# Patient Record
Sex: FEMALE | Race: BLACK OR AFRICAN AMERICAN
[De-identification: names, ages, dates, MRNs, and addresses within clinical notes are randomized per-mention and may not be internally consistent; named-entity substitution may affect disease eponyms.]

---
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
## 2022-04-11 ENCOUNTER — HOSPITAL ENCOUNTER (EMERGENCY)
Dept: HOSPITAL 97 - ER | Age: 22
Discharge: HOME | End: 2022-04-11
Payer: SELF-PAY

## 2022-04-11 VITALS — DIASTOLIC BLOOD PRESSURE: 90 MMHG | OXYGEN SATURATION: 99 % | SYSTOLIC BLOOD PRESSURE: 113 MMHG

## 2022-04-11 DIAGNOSIS — G40.89: Primary | ICD-10-CM

## 2022-04-11 LAB
BUN BLD-MCNC: 11 MG/DL (ref 7–18)
GLUCOSE SERPLBLD-MCNC: 106 MG/DL (ref 74–106)
HCT VFR BLD CALC: 42.9 % (ref 36–45)
LYMPHOCYTES # SPEC AUTO: 2.1 K/UL (ref 0.7–4.9)
PMV BLD: 8.4 FL (ref 7.6–11.3)
POTASSIUM SERPL-SCNC: 4.2 MMOL/L (ref 3.5–5.1)
RBC # BLD: 4.5 M/UL (ref 3.86–4.86)
SP GR UR: 1 (ref 1–1.03)

## 2022-04-11 PROCEDURE — 36415 COLL VENOUS BLD VENIPUNCTURE: CPT

## 2022-04-11 PROCEDURE — 93005 ELECTROCARDIOGRAM TRACING: CPT

## 2022-04-11 PROCEDURE — 70450 CT HEAD/BRAIN W/O DYE: CPT

## 2022-04-11 PROCEDURE — 99284 EMERGENCY DEPT VISIT MOD MDM: CPT

## 2022-04-11 PROCEDURE — 81025 URINE PREGNANCY TEST: CPT

## 2022-04-11 PROCEDURE — 85025 COMPLETE CBC W/AUTO DIFF WBC: CPT

## 2022-04-11 PROCEDURE — 96361 HYDRATE IV INFUSION ADD-ON: CPT

## 2022-04-11 PROCEDURE — 96365 THER/PROPH/DIAG IV INF INIT: CPT

## 2022-04-11 PROCEDURE — 81003 URINALYSIS AUTO W/O SCOPE: CPT

## 2022-04-11 PROCEDURE — 80048 BASIC METABOLIC PNL TOTAL CA: CPT

## 2022-04-11 PROCEDURE — 82947 ASSAY GLUCOSE BLOOD QUANT: CPT

## 2022-04-11 NOTE — XMS REPORT
Continuity of Care Document

                            Created on:2022



Patient:RAJESH CHOW

Sex:Female

:2000

External Reference #:[id number]





Demographics







                          Address                   [address]



                                                    [place] [address]

 

                          Home Phone                [phone]

 

                          Work Phone                [phone]

 

                          Mobile Phone              [phone]

 

                          Email Address             [address]

 

                          Preferred Language        English

 

                          Marital Status            Unknown

 

                          Pentecostalism Affiliation     Unknown

 

                          Race                      Unknown

 

                          Additional Race(s)        Unavailable



                                                    Unavailable



                                                    Unavailable

 

                          Ethnic Group              Unknown









Author







                          Organization              [facility]

t

 

                          Address                   [address]



                                                    [place] [address]

 

                          Phone                     [phone]









Support







                Name            Relationship    Address         Phone

 

                VIRGILIO         OT              [address]    [phone]



                                                [place] [address] 

 

                VIRGILIO         OT              Unavailable     [phone]

 

                J Regina         Grandparent     [address] [phone]



                                                [place] [address] 

 

                JESSICA FRANKLIN       Grandparent     [address]

e



                                                [place] [address] 









Care Team Providers







                    Name                Role                Phone

 

                    PRITI BESS         Primary Care Physician Unavailable

 

                    GAVIOTA NEWTON           Attending Clinician Unavailable

 

                    Lamar NAVA,  S       Attending Clinician [phone]

 

                    UNKNOWN             Attending Clinician Unavailable

 

                    PRITI Bess         Attending Clinician [phone]

 

                    Nurse,  Pob Immunization Attending Clinician Unavailable

 

                    Gary Engle DO   Attending Clinician [phone]

 

                    GARY ENGLE      Attending Clinician Unavailable

 

                    MONDAY              Attending Clinician Unavailable

 

                    MONDAYMASOUD   Attending Clinician Unavailable

 

                                   Attending Clinician Unavailable

 

                    GAVIOTA NEWTON           Admitting Clinician Unavailable

 

                    MONDAYMASOUD   Admitting Clinician Unavailable









Payers







           Payer Name Policy Type Policy Number Effective Date Expiration Date GAVIOTA weathers

 

           [employer]              [id number] 2021            



                                            00:00:00              

 

           [facility]            [id number]  2017            



           MEDICAID                         00:00:00              







Problems







       Condition Condition Condition Status Onset  Resolution Last   Treating Co

mments 

Source



       Name   Details Category        Date   Date   Treatment Clinician        



                                                 Date                 

 

       CONVULSION        Diagnosis Active 2021              

 Memoria



                                   3-          05:07:00               l



                                   00:00:                             Charlie



              CONVULSION               00                                 



                                                                      



                                                                      



               Active                                                  



                                                                      



                                                                      



              2021                                                  



                                                                      



                                                                      



                                                                      



                                                                      



                                                                      



                                                                      



                                                                      



                                                                      



                     [facility]                                                  



                                                                      



                                                                      

 

       No known No known Disease                                           Unive

rs



       active active                                                  ity of



       problems problems                                                  [facility]

 

       Convulsion Convulsion Problem Active                                    U

nivers



       s,     s,                                                      ity of



       unspecifie unspecifie                                                  [place]

[place]



       d      d                                                       Physici



       convulsion convulsion                                                  an

s



       type   type                                                    

 

       Partial Partial Problem Active                                    [facility]



       symptomati symptomati                                                  it

y of



       c epilepsy c epilepsy                                                  Te

[place]



       with   with                                                    Physici



       complex complex                                                  ans



       partial partial                                                  



       seizures, seizures,                                                  



       not    not                                                     



       intractabl intractabl                                                  



       e, without e, without                                                  



       status status                                                  



       epilepticu epilepticu                                                  



       s      s                                                       

 

       Psychogeni Psychogeni Problem Active                                    U

nivers



       c      c                                                       ity of



       nonepilept nonepilept                                                  Te

[place]



       ic seizure ic seizure                                                  Ph

ysici



                                                                      ans







Allergies, Adverse Reactions, Alerts







       Allergy Allergy Status Severity Reaction(s) Onset  Inactive Treating Comm

ents 

Source



       Name   Type                        Date   Date   Clinician        

 

       NO KNOWN Drug   Active                                           [facility]



       ALLERGIE Class                                                   ity of



       S                                                              [facility]







Family History







           Family Member Diagnosis  Comments   Start Date Stop Date  Source

 

           Grandmother Family history of                                  Univer

sity of



                      hypertension                                  [place] [facility]

ians

 

           Grandmother Family history of                                  Univer

sity of



                      cerebrovascular                                  [place] [facility]

sicians



                      accident (CVA)                                  







Social History







           Social Habit Start Date Stop Date  Quantity   Comments   Source

 

           Exposure to                       Not sure              [facility]



           SARS-CoV-2                                             [facility]



           (event)                                                Branch

 

           Alcohol intake 2021 Current               [place]

 of



                      00:00:00   00:00:00   non-drinker of            [place] [facility]

amrik



                                            alcohol               [place]



                                            (finding)             

 

           Tobacco use and 2019 Never used            [facility]

y of



           exposure   00:00:00   00:00:00                         [facility]

 

           Sex Assigned At 2000                       [facility]

y of



           Birth      00:00:00   00:00:00                         [facility]









                Smoking Status  Start Date      Stop Date       Source

 

                Social History                                  [facility]







Medications







       Ordered Filled Start  Stop   Current Ordering Indication Dosage Frequency

 Signature

                    Comments            Components          Source



     Medication Medication Date Date Medication? Clinician                (SIG) 

          



     Name Name                                                   

 

     benzonatate      2021      Yes       [id number] 100mg      Take 1          

 Univers



     100 mg      1-30                               capsule by           ity of



     capsule      00:00:                               mouth 3           [place]



               00                                 (three)           Medical



                                                  times           [place]



                                                  daily as           



                                                  needed for           



                                                  Cough.           

 

     Saline            No                       Notes:           [facility]



     Flush 0.9%      5-02                               Same as:           l



               02:00:                               BD             Charlie



                                                Posiflush           



                                                  Sterile           

 

     Saline            No                       Notes:           [facility]



     Flush 0.9%      5-01                               Same as:           l



               15:58:                               BD             Charlie



               00                                 Posiflush           



                                                  Sterile           

 

     medroxyPROG            Yes                      GIVE 1 ML           U

nivers



     ESTERone      3-26                               IM IN LEFT           ity o

f



     150 mg/mL      00:00:                               DELTOID OF           Te

[place]



     syringe      00                                 ARM- BRING           Medica

l



                                                  TO [facility]           Branch

 

     medroxyPROG            Yes                      GIVE 1 ML           U

nivers



     ESTERone      3-26                               IM IN LEFT           ity o

f



     150 mg/mL      00:00:                               DELTOID OF           Te

xas



     syringe      00                                 ARM- BRING           Medica

l



                                                  TO [facility]           Branch

 

     medroxyPROG            Yes                      GIVE 1 ML           U

nivers



     ESTERone      3-26                               IM IN LEFT           ity o

f



     150 mg/mL      00:00:                               DELTOID OF           Te

[place]



     syringe      00                                 ARM- BRING           Medica

l



                                                  TO [facility]

 

     levETIRAcet levETIRAcet           Yes  LVN       1    Q0.5D TAKE 1         

  Univers



     am 500 MG am 500 MG                                    TABLET           ity

 of



     Oral Tablet Oral Tablet                                    TWICE           

[place]



                                                  DAILY AS           Physici



                                                  DIRECTED           ans







Immunizations







           Ordered    Filled Immunization Date       Status     Comments   [facility]

e



           Immunization Name Name                                        

 

           SARS-COV-2 COVID-19            2021 Completed             Unive

rsity of



           PFIZER VACCINE            00:00:00                         [facility]

 

           SARS-COV-2 COVID-19            2021 Completed             Unive

rsity of



           PFIZER VACCINE            00:00:00                         [facility]

 

           SARS-COV-2 COVID-19            2021 Completed             Unive

rsity of



           PFIZER VACCINE            00:00:00                         [facility]

 

           SARS-COV-2 COVID-19            2021 Completed             Unive

rsity of



           PFIZER VACCINE            00:00:00                         [facility]

 

           SARS-COV-2 COVID-19            2021 Completed             Unive

rsity of



           PFIZER VACCINE            00:00:00                         [facility]

 

           SARS-COV-2 COVID-19            2021 Completed             Unive

rsity of



           PFIZER VACCINE            00:00:00                         [place] [facility]

amrik



                                                                  Branch







Vital Signs







             Vital Name   Observation Time Observation Value Comments     Source

 

             Systolic blood 2021   137 mm[Hg]                [facility]



             pressure     19:22:00                               [facility]

 

             Diastolic blood 2021   91 mm[Hg]                 [place] [facility]





             pressure     19:22:00                               [facility]

 

             Heart rate   2021   81 /min                   [facility]



                          19:22:00                               [facility]

 

             Body temperature 2021   36.5 Roberta                  [facility] 





                          19:22:00                               [facility]

 

             Respiratory rate 2021   20 /min                   [facility]



                          19:22:00                               [facility]

 

             Body weight  2021   69.854 kg                 [facility]



                          19:22:00                               [facility]

 

             Oxygen saturation 2021   100 /min                  [facility]



             in Arterial blood 19:22:00                               [place] [facility]

am[facility]



             by Pulse oximetry                                        Branch

 

             Temperature Oral 2021   98.9 F                    [facility]

[facility]



             (F)          14:00:00                               

 

             Heart Rate   2021                             [facility]

n



                          14:00:00                               

 

             Respitory Rate 2021                             [facility]

[facility]



                          14:00:00                               

 

             Systolic (mm Hg) 2021                             [facility]

[facility]



                          14:00:00                               

 

             Diastolic (mm Hg) 2021                             [facility]

ermann



                          14:00:00                               

 

             Temperature Oral 2021   98.9 F                    [facility]

[facility]



             (F)          01:00:00                               

 

             Heart Rate   2021                             [facility]

n



                          01:00:00                               

 

             Respitory Rate 2021                             [facility]

[facility]



                          01:00:00                               

 

             Systolic (mm Hg) 2021                             [facility]

rm[facility]



                          01:00:00                               

 

             Diastolic (mm Hg) 2021                             [facility]

ermann



                          01:00:00                               

 

             Temperature Oral 2021   99 F                      [facility]

[facility]



             (F)          19:00:00                               

 

             Heart Rate   2021                             [facility]

n



                          19:00:00                               

 

             Respitory Rate 2021                             [facility]

[facility]



                          19:00:00                               

 

             Systolic (mm Hg) 2021                             [facility]

[facility]



                          19:00:00                               

 

             Diastolic (mm Hg) 2021                             [facility]

ermann



                          19:00:00                               

 

             Height       2021   162.56 cm                 [facility]

n



                          14:55:00                               

 

             Weight       2021                             [facility]

n



                          14:55:00                               

 

             BMI Calculated 2021                             [facility]

[facility]



                          14:55:00                               

 

             Body temperature 2021-03-10   97.3 [degF]  Method:      [facility]



                          09:44:00                  Temporal     [place] [facility]

s

 

             Heart Rate   2021-03-10   92 /min                   [facility]



                          09:44:00                               [place] [facility]

s

 

             Systolic blood 2021-03-10   119 mm[Hg]   [facility]     09:44:00                  Position:    [place] [facility]

s



                                                    Sitting      

 

             Diastolic blood 2021-03-10   80 mm[Hg]    Location: [facility]     09:44:00                  Position:    [place] [facility]

s



                                                    Sitting      

 

             Body height  2021-03-10   65 [in_us]                [facility]



                          09:44:00                               [place] [facility]

s

 

             Weight       2021-03-10   156.5 [lb_av]              [facility]



                          09:44:00                               [place] [facility]

s

 

             Body mass index 2021-03-10   26.04 kg/m2               [facility]



             (BMI) [Ratio] 09:44:00                               [place] [facility]

ns







Procedures







                Procedure       Date / Time     Performing Clinician Source



                                Performed                       

 

                XR CHEST 1 VW   2021 19:51:00 Chrystal Newton  [place] [facility]

[facility]

 

                CONSENT/REFUSAL FOR 2021 19:12:50 Doctor Unassigned, No [facility]

iversity of 

[place]



                DIAGNOSIS AND TREATMENT                 Name            [facility]

 

                SARS-COV-2 COVID-19 2021 22:34:51 Doctor Unassigned, No [facility]

[facility] 

[place]



                VACCINE,0.3ML,IM                 Name            [facility]



                (PFIZER)                                        

 

                [UTP] Neuro EMU 2021-04-15 00:00:00                 [facility]



                                                                Physicians

 

                [UTP] Neuro EMU 2021 00:00:00                 [facility]



                                                                Physicians

 

                History of Tonsillectomy                                 [facility]



                                                                Physicians







Plan of Care







             Planned Activity Planned Date Details      Comments     Source

 

             Diagnostic Test 2021-04-15   [UTP] Neuro EMU              [facility]



             Pending      00:00:00     [code = [UTP]              Physicians



                                       Neuro EMU]                

 

             Diagnostic Test 2021-04-15   [UTP] Neuro EMU              [facility]

[facility]



             Pending      00:00:00     [code = [UTP]              Physicians



                                       Neuro EMU]                

 

             Diagnostic Test 2021-04-15   [UTP] Neuro EMU              Univers[facility]

[facility]



             Pending      00:00:00     [code = [UTP]              Physicians



                                       Neuro EMU]                

 

             Diagnostic Test 2021-04-15   [UTP] Neuro EMU              [facility]



             Pending      00:00:00     [code = [UTP]              Physicians



                                       Neuro EMU]                

 

             Diagnostic Test 2021   [UTP] Neuro EMU              [facility]



             Pending      00:00:00     [code = [UTP]              Physicians



                                       Neuro EMU]                

 

             Diagnostic Test 2021   [UTP] Neuro EMU              Universit

[facility]



             Pending      00:00:00     [code = [UTP]              Physicians



                                       Neuro EMU]                

 

             Diagnostic Test 2021   [UTP] Neuro EMU              Universit

[facility]



             Pending      00:00:00     [code = [UTP]              Physicians



                                       Neuro EMU]                

 

             Diagnostic Test 2021   [UTP] Neuro EMU              Universit

[facility]



             Pending      00:00:00     [code = [UTP]              Physicians



                                       Neuro EMU]                

 

             Diagnostic Test 2021   [UTP] Neuro EMU              Universit

[facility]



             Pending      00:00:00     [code = [UTP]              Physicians



                                       Neuro EMU]                

 

             Diagnostic Test 2021   [UTP] Neuro EMU              Universit

[facility]



             Pending      00:00:00     [code = [UTP]              Physicians



                                       Neuro EMU]                







Encounters







        Start   End     Encounter Admission Attending Care    Care    Encounter 

Source



        Date/Time Date/Time Type    Type    Clinicians Facility Department ID   

   

 

        2021         Emergency                 [facility]    [id number] 

Univers



        16:50:45                                                         [facility]

 

        2021 Emergency X       LAMAR  [facility]    ERT     [id number]

[id number] Univers



        13:23:00 15:46:00                 CHRYSTAL                           [facility]

 

        2021 [facility]         [facility]    [phone] [id number]

1649 Univers



        13:23:00 15:46:00                 Chrystal VILLAFUERTE [id number]         i

[facility] [id number]         [facility]  [phone]         [address]

 

        2021 Outpatient R               [facility]    [id number]

-20 Univers



        20:40:00 20:40:00                                         [id number]  [facility]

 

        2021 Outpatient R       UNKNOWN, [facility]    [id number]

[id number] Univers



        20:40:00 20:40:00                 ATTENDING                         [facility]

 

        2021 Telephone         MIKE Bess    [id number]

[id number] Univers



        00:00:00 00:00:00                 Natalie ALBRIGHT   [id number]       

  [facility] [id number]         Shree

as



                                                        [phone]         Medi

amrik



                                                        019             Branch

 

        2021 Imm/Inj         Nurse, Adc Pob Immunization [facility]  

  [id number] 

[id number]                                Univers



        17:40:00 17:50:00 Visit           OnielSteventon 350.1.13

.10         [facility] [id number]         [place]

[place]



                                                [place] [phone]         Me

dical



                                                nal     421             [facility]                 

 

        2021 Outpatient RERE ENGLE  [facility]    [id number]

815 Univers



        17:40:00 17:40:00                 [facility]

 

        2021 Outpatient RERE ENGLE  [facility]    [id number]

334 Univers



        17:50:00 17:50:00                 [facility]

 

        2021 Outpatient                 [facility]    [id number]

670 Univers



        11:30:00 11:30:00                                                 [facility]

 

        2021 Outpatient RERE ENGLE  [facility]    [id number]

217 Univers



        17:00:00 17:00:00                 [facility]

 

        [facility]    [id number]

593 [facility]



        14:30:00 14:30:00                 [facility]

 

        2021 Outpatient                 [facility]    [id number]

989 Univers



        17:50:00 17:50:00                                                 [facility]

 

        2021 Appointmen         MONDAY, [facility]     Neurology - 743

[id number] Univers



        16:30:00 16:30:00 t; SATYA DUCKWORTH [place] of



                        DANE HERNADEZ            [facility]



                        M.D.                            [place]          Physici



                                                                        ans

 

        2021 Inpatient                 [facility] [id number]

[address] Memoria



        15:02:00 21:20:00                         rere Guerra 00      [facility]

 

        2021 Inpatient         MONDAY, [facility]    7500    

[facility]



        10:02:00 16:20:00                 SATYA                         

 

        2021 Outpatient R               [facility]    [id number]

-20 Univers



        13:00:00 13:00:00                                         [id number]  [facility]

 

        2021-03-10 2021-03-10 Appointmen         ANN DUCKWORTH     Neurology - 716

[id number] Univers



        09:30:00 09:30:00 t; SATYA DUCKWORTH [place] KIMBERLY, M.D.            [facility]                            [place]          Physici



                                                                        ans

 

        2020 Outpatient                 [facility]    [id number]

-20 Univers



        15:40:00 15:40:00                                         2012  [facility]

 

        2020 [facility]       [facility]    [id number]

551 Univers



        15:40:00 15:40:00                 ANJALI                           [facility]







Results







           Test Description Test Time  Test Comments Results    Result     Sourc

e



                                                       Comments   

 

           URINE CHEM 2021            Negative              [facility]



                      22:15:00              (21 5:15            [facility]



                                            PM)                   

 

           CHEM PANEL 2021            1.02                  [facility]



                      20:25:00                                    [facility]

 

           CHEM PANEL 2021            139                   [facility]



                      20:25:00                                    [facility]

 

           CHEM PANEL 2021            4.0                   [facility]



                      20:25:00                                    [facility]

 

           CHEM PANEL 2021            110                   [facility]



                      20:25:00                                    [facility]

 

           CHEM PANEL 2021            24                    [facility]



                      20:25:00                                    [place]

 

           CHEM PANEL 2021            9.0                   [facility]



                      20:25:00                                    [facility]

 

           CHEM PANEL 2021            7.7                   [facility]



                      20:25:00                                    [facility]

 

           CHEM PANEL 2021            4.1                   [facility]



                      20:25:00                                    [facility]

 

           CHEM PANEL 2021            18                    [facility]



                      20:25:00                                    [facility]

 

           CHEM PANEL 2021            12                    [facility]



                      20:25:00                                    [place]

 

           CHEM PANEL 2021            106                   [facility]



                      20:25:00                                    [facility]

 

           CHEM PANEL 2021            0.3                   [facility]



                      20:25:00                                    [facility]

 

           CHEM PANEL 2021            9.0                   [facility]



                      20:25:00                                    [place]









                    CHEM PANEL          2021 20:25:00 









                      Test Item  Value      Reference Range Interpretation Comme

nts









             B/C Ratio (test code = B/C Ratio) 13 1         6-25                

      



[facility]ann[facility]M [id number] 20:25:003.6[facility]annCHEM PANEL
2021 20:25:00





             Test Item    Value        Reference Range Interpretation Comments

 

             A/G Ratio (test code = A/G Ratio) 1.1 1        0.7-1.6             

      



[facility]annCHEM [id number] 20:25:0080[facility]annHEMATOLOGY
2021 20:25:0047.7[facility]orial ZfmojdrRYYXIMKWTU5351-43-78 20:25:0041.3[facility]
ZmmmhsxEGEQVPREIK2335-12-75 20:25:009.0[facility] LndvtomJXHJHHNAOZ0558-54-12 
20:25:001.5[facility] YzgvbweIGYKKWZIEO0055-15-84 20:25:000.5[facility]
DGNGPMXXEA1167-52-01 20:25:003.3[facility] XectjljVNOIZPLPAJ5221-15-69 20:25:002.9
[facility] TdfmckmDOFYWBLAJH9189-55-01 20:25:000.6[facility]annHEMATOLOGY
2021 20:25:000.1[facility] RboyrfqDBOKEUJKRH0932-44-98 20:25:006.9Memorial 
CdvxgmbAHTXEXDIAW4387-77-11 20:25:004.54[facility] NvecvusFMNEWLXSPY3975-98-44 
20:25:0014.7[facility] DufbdyhFZNYQRBYRJ8560-81-57 20:25:0043.5[facility]
YCGAPYRCTJ5085-74-60 20:25:0095.9[facility] GoajbryIRKIZCALBZ6114-08-32 20:25:00





             Test Item    Value        Reference Range Interpretation Comments

 

             MCH (test code = MCH) 32.4 pg      27.0-31.0                 



[facility] IqruvdkHOSNTJORUI6414-74-00 20:25:0033.8[facility] HermannHEMATOLOGY
2021 20:25:0012.6[facility] DgxgielBEIJAFEKGY6397-74-38 20:25:99042Qksgmtaf 
MxtxwjtCXPZCYJVBQ6009-44-87 20:25:009.1[facility] HermannCHEM XTYSF3833-37-63 
20:25:00&lt;0.1[facility] HermannCHEM ORBRG7122-24-93 20:25:0083[facility]
CHEM [id number] 20:25:0013[facility] VugvzfzYNJXDIEZIQ8602-42-62 15:59:00Not 
Detected (21 10:59 AM)[facility]

## 2022-04-11 NOTE — EDPHYS
Physician Documentation                                                                           

 [facility]                                                                 

Name: Maria Teresa Mtz                                                                                  

Age: 21 yrs                                                                                       

Sex: Female                                                                                       

: 2000                                                                                   

MRN: [id number]                                                                                   

Arrival Date: 2022                                                                          

Time: 12:06                                                                                       

Account#: [id number]                                                                            

Bed 3                                                                                             

Private MD:                                                                                       

ED Physician Steve Gary                                                                         

HPI:                                                                                              

                                                                                             

13:04 This 21 yrs old Black Female presents to ER via Other with complaints of possible       rn  

      seizure.                                                                                    

13:04 The patient presents with a history of multiple seizures, a total of 2. Character of    rn  

      seizure(s): Motor activity: generalized, Incontinence: none, Apnea: the patient did not     

      experience apnea, Circulation: the patient did not experience evidence of pulse             

      disturbance. Seizure onset: just prior to arrival. Seizure Hx: Last seizure: The            

      patient's last seizure was approximately 6 month(s) ago. Associated injury: The patient     

      did not suffer any apparent associated injury. Current symptoms: headache, soreness.        

      The patient has experienced similar episodes in the past. The patient has not recently      

      seen a physician. Pt is employee, in cafeteria, had just received bad news that             

      grandmother , sat down, then witnessed to have 2 brief episodes of possible seizure     

      activity, each lasted approx 30 seconds followed by sluggish response and confusion.        

      Currently reports headache and generalized soreness. No injury. Reports has had similar     

      episodes in past, seen by neurology, was put on keppra at one point, but then later         

      told they didn't think it was seizures, could be anxiety is what she said. No fever or      

      recent illness. .                                                                           

                                                                                                  

OB/GYN:                                                                                           

13:13 LMP N/A - Birth control method                                                          [id number] 

                                                                                                  

Historical:                                                                                       

- Allergies:                                                                                      

12:19 No Known Allergies;                                                                     jg9 

- Home Meds:                                                                                      

12:19 Depo-Provera IM [Active];                                                               jg9 

- PMHx:                                                                                           

12:19 Seizures;                                                                               jg9 

- PSHx:                                                                                           

12:19 Tonsillectomy;                                                                          jg9 

                                                                                                  

- Immunization history:: Client reports receiving the 2nd dose of the Covid vaccine.              

- Social history:: Smoking status: Patient denies any tobacco usage or history of.                

- Family history:: not pertinent.                                                                 

- Hospitalizations: : No recent hospitalization is reported.                                      

                                                                                                  

                                                                                                  

ROS:                                                                                              

13:04 Constitutional: Negative for fever, chills, and weight loss, Eyes: Negative for injury, rn  

      pain, redness, and discharge, Neck: Negative for injury, pain, and swelling,                

      Cardiovascular: Negative for chest pain, palpitations, and edema, Respiratory: Negative     

      for shortness of breath, cough, wheezing, and pleuritic chest pain, Abdomen/GI:             

      Negative for abdominal pain, nausea, vomiting, diarrhea, and constipation, Back:            

      Negative for injury and pain, MS/Extremity: Negative for injury and deformity, Skin:        

      Negative for injury, rash, and discoloration, Neuro: Negative for weakness, numbness,       

      tingling                                                                                    

                                                                                                  

Exam:                                                                                             

13:04 Constitutional:  This is a well developed, well nourished patient who is awake, alert,  rn  

      and in no acute distress. Head/Face:  Normocephalic, atraumatic. Eyes:  Pupils equal        

      round and reactive to light, extra-ocular motions intact.  ENT:  No tongue laceration       

      Neck:  Trachea midline, no masses palpated, and no cervical lymphadenopathy.  Supple,       

      full range of motion without nuchal rigidity, or vertebral point tenderness.  No            

      Meningismus. Cardiovascular:  Regular rate and rhythm.  No pulse deficits. Respiratory:     

       No increased work of breathing, no retractions or nasal flaring. Abdomen/GI:  Soft,        

      non-tender Skin:  Warm, dry with normal turgor.  Normal color with no rashes, no            

      lesions, and no evidence of cellulitis. MS/ Extremity:  Pulses equal, no cyanosis.          

      Neurovascular intact.  Full, normal range of motion.  Equal circumference. Neuro:           

      Awake and alert, GCS 15, oriented to person, place, time, and situation.  Cranial           

      nerves II-XII grossly intact.  Motor strength 5/5 in all extremities.  Sensory grossly      

      intact.  Cerebellar exam normal.                                                            

                                                                                                  

Vital Signs:                                                                                      

11:45  / 110; Pulse 95; Resp 18 S; Pulse Ox 99% on R/A; Weight 65.77 kg; Height 5 ft. 6 jg9 

      in. (167.64 cm); Pain 0/10;                                                                 

13:13  / 90; Pulse 78; Resp 14 S; Pulse Ox 99% on R/A;                                  jg9 

11:45 Body Mass Index 23.40 (65.77 kg, 167.64 cm)                                             jg9 

                                                                                                  

MDM:                                                                                              

12:06 Patient medically screened.                                                             rn  

14:27 Differential diagnosis: seizure. Differential diagnosis: syncope, stress reaction,      rn  

      hyperventilation. Data reviewed: vital signs, nurses notes.                                 

14:27 Counseling: I had a detailed discussion with the patient and/or guardian regarding: the rn  

      historical points, exam findings, and any diagnostic results supporting the                 

      discharge/admit diagnosis, lab results, radiology results, the need for outpatient          

      follow up, to return to the emergency department if symptoms worsen or persist or if        

      there are any questions or concerns that arise at home. Response to treatment: the          

      patient's symptoms have resolved after treatment, the patient's condition has returned      

      to base line, the patient is now symptom free, and as a result, I will discharge            

      patient. Special discussion: I discussed with the patient/guardian in detail that at        

      this point there is no indication for admission to the hospital. It is understood,          

      however, that if the symptoms persist or worsen the patient needs to return immediately     

      for re-evaluation.                                                                          

                                                                                                  

                                                                                             

12:07 Order name: CBC with Diff; Complete Time: 12:41                                         rn  

                                                                                             

12:07 Order name: Basic Metabolic Panel; Complete Time: 12:41                                 rn  

                                                                                             

12:21 Order name: glucometer results - FOR PT WITH NO ID; Complete Time: 13:42                ss  

                                                                                             

12:39 Order name: Urine Dipstick-Ancillary; Complete Time: 12:41                              EDMS

                                                                                             

14:23 Order name: Pregnancy Test Urine - POC                                                  jg9 

                                                                                             

12:07 Order name: CT Head Brain wo Cont; Complete Time: 12:41                                 rn  

                                                                                             

12:07 Order name: IV Start; Complete Time: 12:15                                              rn  

                                                                                             

12:07 Order name: EKG; Complete Time: 12:08                                                   rn  

                                                                                             

12:07 Order name: EKG - Nurse/Tech; Complete Time: 12:15                                      rn  

                                                                                             

12:07 Order name: Cardiac monitoring; Complete Time: 12:15                                    rn  

                                                                                             

12:07 Order name: O2 Sat Monitoring; Complete Time: 12:15                                     rn  

                                                                                             

12:07 Order name: Urine Dipstick-Ancillary (obtain specimen); Complete Time: 12:40            rn  

                                                                                             

12:07 Order name: Urine Pregnancy Test (obtain specimen); Complete Time: 12:40                rn  

                                                                                             

12:07 Order name: Glucose Level; Complete Time: 12:15                                         rn  

                                                                                                  

Administered Medications:                                                                         

12:26 Drug: NS 0.9% 500 ml Route: IV; Rate: bolus; Site: right antecubital;                   ww  

13:18 Follow up: IV Status: Completed infusion; IV Intake: 500ml                              jg9 

12:27 Drug: Keppra (levETIRAcetam) 1000 mg Route: IV; Rate: calculated rate; Site: right      ww  

      antecubital;                                                                                

12:45 Follow up: IV Status: Completed infusion; IV Intake: 250ml                              jg9 

                                                                                                  

                                                                                                  

Point of Care Testing:                                                                            

      Urine Pregnancy:                                                                            

14:26 hCG Reading: Negative; Control Reading: Positive;                                       jg9 

Disposition Summary:                                                                              

22 14:29                                                                                    

Discharge Ordered                                                                                 

      Location: Home                                                                          rn  

      Problem: new                                                                            rn  

      Symptoms: have improved                                                                 rn  

      Condition: Stable                                                                       rn  

      Diagnosis                                                                                   

        - Other seizures                                                                      rn  

      Followup:                                                                               rn  

        - With: Shane Lewis MD                                                               

        - When: As needed                                                                          

        - Reason: Recheck today's complaints, Re-evaluation by your physician                      

      Discharge Instructions:                                                                     

        - Discharge Summary Sheet                                                             rn  

        - Seizure, Adult                                                                      rn  

      Forms:                                                                                      

        - Medication Reconciliation Form                                                      rn  

        - Thank You Letter                                                                    rn  

        - Antibiotic Education                                                                rn  

        - Prescription Opioid Use                                                             rn  

      Prescriptions:                                                                              

        - Keppra 500 mg Oral Tablet                                                                

            - take 1 tablet by ORAL route every 12 hours; 60 tablet; Refills: 0, Product      rn  

      Selection Permitted                                                                         

Signatures:                                                                                       

Dispatcher [employer]                           Steve Jimenez MD MD rn Gilmore, Jennifer, RN                   RN   jg9                                                  

Tyesha Echevarria RN                       MIKE                                                      

                                                                                                  

**************************************************************************************************

## 2022-04-11 NOTE — ER
Nurse's Notes                                                                                     

 [facility]                                                                 

Name: Maria Teresa Mtz                                                                                  

Age: 21 yrs                                                                                       

Sex: Female                                                                                       

: 2000                                                                                   

MRN: [id number]                                                                                   

Arrival Date: 2022                                                                          

Time: 12:06                                                                                       

Account#: [id number]                                                                            

Bed 3                                                                                             

Private MD:                                                                                       

Diagnosis: Other seizures                                                                         

                                                                                                  

Presentation:                                                                                     

                                                                                             

11:45 Chief complaint: Patient had a seizure. Ebola Screen: Patient negative for fever        jg9 

      greater than or equal to 101.5 degrees Fahrenheit, and additional compatible Ebola          

      Virus Disease symptoms Patient denies exposure to infectious person. Patient denies         

      travel to an Ebola-affected area in the 21 days before illness onset. Initial Sepsis        

      Screen: Does the patient meet any 2 criteria? No. Patient's initial sepsis screen is        

      negative. Does the patient have a suspected source of infection? No. Patient's initial      

      sepsis screen is negative. Risk Assessment: Do you want to hurt yourself or someone         

      else? Patient reports no desire to harm self or others. Onset of symptoms was 2022.                                                                                       

11:45 Method Of Arrival: Other                                                                [facility] [place] 

11:45 Acuity: BRITTON 3                                                                           [facility] [place] 

13:15 Coronavirus screen: Vaccine status: Patient reports receiving the 2nd dose of the covid jg9 

      vaccine.                                                                                    

                                                                                                  

Triage Assessment:                                                                                

11:45 General: Appears in no apparent distress. Behavior is calm, cooperative, Patient        jenn 

      reports that she has been advised that she does not have a seizure disorder. Patient        

      reports multiple similar events in the past for which she had a complete work up on and     

      was advised it was secondary to her anxiety-patient a\T\ox4.                                

11:45 Pain: Denies pain.                                                                      jg9 

                                                                                                  

OB/GYN:                                                                                           

13:13 LMP N/A - Birth control method                                                          jg9 

                                                                                                  

Historical:                                                                                       

- Allergies:                                                                                      

12:19 No Known Allergies;                                                                     jg9 

- Home Meds:                                                                                      

12:19 Depo-Provera IM [Active];                                                               jg9 

- PMHx:                                                                                           

12:19 Seizures;                                                                               jg9 

- PSHx:                                                                                           

12:19 Tonsillectomy;                                                                          jg9 

                                                                                                  

- Immunization history:: Client reports receiving the 2nd dose of the Covid vaccine.              

- Social history:: Smoking status: Patient denies any tobacco usage or history of.                

- Family history:: not pertinent.                                                                 

- Hospitalizations: : No recent hospitalization is reported.                                      

                                                                                                  

                                                                                                  

Screenin:20 Abuse screen: Denies threats or abuse. Denies injuries from another. Nutritional        jg9 

      screening: No deficits noted. Tuberculosis screening: No symptoms or risk factors           

      identified. Fall Risk None identified.                                                      

                                                                                                  

Assessment:                                                                                       

13:15 Reassessment: Patient appears in no apparent distress at this time. Patient and/or      jg9 

      family updated on plan of care and expected duration. Pain level reassessed. Patient        

      states feeling better.                                                                      

                                                                                                  

Vital Signs:                                                                                      

11:45  / 110; Pulse 95; Resp 18 S; Pulse Ox 99% on R/A; Weight 65.77 kg; Height 5 ft. 6 jg9 

      in. (167.64 cm); Pain 0/10;                                                                 

13:13  / 90; Pulse 78; Resp 14 S; Pulse Ox 99% on R/A;                                  jg9 

11:45 Body Mass Index 23.40 (65.77 kg, 167.64 cm)                                             jg9 

                                                                                                  

ED Course:                                                                                        

12:05 Inserted saline lock: 20 gauge in right forearm, using aseptic technique. Blood         jg9 

      collected.                                                                                  

12:06 Patient arrived in ED.                                                                  rn  

12:06 Steve Gary MD is Attending Physician.                                                rn  

12:18 EKG done, by ED staff, reviewed by Steve Gary MD.                                      mb7 

12:18 Patient has correct armband on for positive identification. Bed in low position. Call   mb7 

      light in reach. Side rails up X 1. Door closed. Noise minimized. Warm blanket given.        

12:19 Triage completed.                                                                       jg9 

12:22 CT Head Brain wo Cont In Process Unspecified.                                           EDMS

13:10 Patient requesting to go to the restroom-no assistance needed.                          jg9 

13:15 Arm band placed on right wrist.                                                         jg9 

13:19 No provider procedures requiring assistance completed.                                  jg9 

13:20 Awaiting disposition, Awaiting: Patient reports she is ready to go home, patient a\T\ox   jg9

      4, NAD, vss.                                                                                

14:27 Pregnancy Test Urine - POC Sent.                                                        jg9 

14:27 Pregnancy Test, Serum: urine specific gravity too low for urine pregnancy test Sent.    jg9 

14:28 Shane Lewis MD is Referral Physician.                                             rn  

14:46 IV discontinued, bleeding controlled, No redness/swelling at site. Pressure dressing    ww  

      applied.                                                                                    

                                                                                                  

Administered Medications:                                                                         

12:26 Drug: NS 0.9% 500 ml Route: IV; Rate: bolus; Site: right antecubital;                   ww  

13:18 Follow up: IV Status: Completed infusion; IV Intake: 500ml                              jg9 

12:27 Drug: Keppra (levETIRAcetam) 1000 mg Route: IV; Rate: calculated rate; Site: right      ww  

      antecubital;                                                                                

12:45 Follow up: IV Status: Completed infusion; IV Intake: 250ml                              jg9 

                                                                                                  

                                                                                                  

Point of Care Testing:                                                                            

      Urine Pregnancy:                                                                            

14:26 hCG Reading: Negative; Control Reading: Positive;                                       jg9 

Intake:                                                                                           

12:45 IV: 250ml; Total: 250ml.                                                                jg9 

13:18 IV: 500ml; Total: 750ml.                                                                jg9 

                                                                                                  

Outcome:                                                                                          

14:29 Discharge ordered by MD.                                                                rn  

14:46 Discharged to home ambulatory.                                                          ww  

14:46 Condition: stable                                                                           

14:46 Discharge instructions given to patient, Instructed on discharge instructions, follow       

      up and referral plans. medication usage, safety practices, Demonstrated understanding       

      of instructions, follow-up care, medications, Prescriptions given X 1.                      

14:48 Patient left the ED.                                                                    ww  

                                                                                                  

Signatures:                                                                                       

Dispatcher [employer]                           EDSteve Moraes MD MD rn Breneman, Mary                               mb7                                                  

Bhavani Lopez RN                   RN   jgTyesha Zambrano RN                       RN   ww                                                   

                                                                                                  

**************************************************************************************************

## 2022-04-11 NOTE — RAD REPORT
EXAM DESCRIPTION:  CT - Head Brain Wo Cont - 4/11/2022 12:21 pm

 

CLINICAL HISTORY:  SEIZURE

 

COMPARISON:  Head Brain Wo Cont dated 9/20/2021

 

TECHNIQUE:  Axial 5 mm thick images of the head were obtained without IV contrast.

 

All CT scans are performed using dose optimization technique as appropriate and may include automated
 exposure control or mA/KV adjustment according to patient size.

 

FINDINGS:  No intracranial hemorrhage, mass, edema or shift of mid-line structures. No acute infarcti
on changes seen. No abnormal extra-axial fluid collections. Ventricles are normal.

 

Mastoid air cells and visualized portions of the paranasal sinuses are clear.

 

No acute bony findings.

 

 

IMPRESSION:  Negative non-contrast CT head examination.

 

No significant change from September 2021.

## 2022-04-13 NOTE — EKG
Test Date:    2022-04-11               Test Time:    12:11:09

Technician:   MB                                     

                                                     

MEASUREMENT RESULTS:                                       

Intervals:                                           

Rate:         85                                     

MD:           148                                    

QRSD:         76                                     

QT:           352                                    

QTc:          418                                    

Axis:                                                

P:            93                                     

MD:           148                                    

QRS:          93                                     

T:            79                                     

                                                     

INTERPRETIVE STATEMENTS:                                       

                                                     

*** Suspect arm lead reversal, interpretation assumes no reversal

Normal sinus rhythm

Rightward axis

Borderline ECG

Compared to ECG 09/20/2021 18:47:32

Right-axis deviation now present

Sinus bradycardia no longer present



Electronically Signed On 04-13-22 10:58:10 CDT by Marlo Mckeon

## 2023-01-30 ENCOUNTER — HOSPITAL ENCOUNTER (EMERGENCY)
Dept: HOSPITAL 97 - ER | Age: 23
Discharge: HOME | End: 2023-01-30
Payer: COMMERCIAL

## 2023-01-30 VITALS — DIASTOLIC BLOOD PRESSURE: 90 MMHG | OXYGEN SATURATION: 100 % | SYSTOLIC BLOOD PRESSURE: 131 MMHG | TEMPERATURE: 97.8 F

## 2023-01-30 DIAGNOSIS — Z32.02: Primary | ICD-10-CM

## 2023-01-30 LAB
BUN BLD-MCNC: 11 MG/DL (ref 7–18)
GLUCOSE SERPLBLD-MCNC: 110 MG/DL (ref 74–106)
HCG SERPL-ACNC: < 1 MIU/ML (ref 1–3)
HCT VFR BLD CALC: 47.3 % (ref 36–45)
LYMPHOCYTES # SPEC AUTO: 1.8 K/UL (ref 0.7–4.9)
MCV RBC: 96.8 FL (ref 80–100)
PMV BLD: 8.7 FL (ref 7.6–11.3)
POTASSIUM SERPL-SCNC: 4.4 MMOL/L (ref 3.5–5.1)
RBC # BLD: 4.88 M/UL (ref 3.86–4.86)

## 2023-01-30 PROCEDURE — 99283 EMERGENCY DEPT VISIT LOW MDM: CPT

## 2023-01-30 PROCEDURE — 36415 COLL VENOUS BLD VENIPUNCTURE: CPT

## 2023-01-30 PROCEDURE — 80048 BASIC METABOLIC PNL TOTAL CA: CPT

## 2023-01-30 PROCEDURE — 84702 CHORIONIC GONADOTROPIN TEST: CPT

## 2023-01-30 PROCEDURE — 85025 COMPLETE CBC W/AUTO DIFF WBC: CPT

## 2023-01-30 NOTE — XMS REPORT
Continuity of Care Document

                           Created on:2023



Patient:RAJESH CHOW

Sex:Female

:2000

External Reference #:[id number]





Demographics







                          Address                   [address]



                                                    [place] [address]

 

                          Home Phone                [phone]

 

                          Work Phone                [phone]

 

                          Mobile Phone              [phone]

 

                          Email Address             NONE

 

                          Preferred Language        English

 

                          Marital Status            Unknown

 

                          Religion Affiliation     Unknown

 

                          Race                      Unknown

 

                          Additional Race(s)        Unavailable



                                                    Unavailable



                                                    Unavailable

 

                          Ethnic Group              Unknown









Author







                          Organization              [facility]

t

 

                          Address                   [address]



                                                    [place] [address]

 

                          Phone                     [phone]









Support







                Name            Relationship    Address         Phone

 

                ROBERT POOLE OT              [address]    [phone]



                                                [place] [address] 

 

                ROBERT POOLE OT              Unavailable     [phone]

 

                J Sangeetha Tapia Grandparent     [address] [facility] [facility] [phone]



                                                [place] [address] 

 

                SANGEETHA TAPIA Grandparent     [address] [facility] [facility] [address]

e



                                                [place] [address] 









Care Team Providers







                    Name                Role                Phone

 

                    NATALIE BESS Primary Care Physician Unavailable

 

                    RADIOLOGY           Attending Clinician Unavailable

 

                    Radiology           Attending Clinician Unavailable

 

                    CHRYSTAL NEWTON      Attending Clinician Unavailable

 

                    Chrystal Silva  Attending Clinician [phone]

 

                    UNKNOWN, ATTENDING  Attending Clinician Unavailable

 

                    Natalie Bess Attending Clinician [phone]

 

                    Nurse, Adc Pob Immunization Attending Clinician Unavailable

 

                    Steven Malone DO Attending Clinician [phone]

 

                    STEVEN MALONE Attending Clinician Unavailable

 

                    MONDAYSATYA M.D. Attending Clinician Unavailable

 

                    MONDAYSATYA Attending Clinician Unavailable

 

                    MondaySatya Attending Clinician [phone]

 

                    ANJALI CUEVA        Attending Clinician Unavailable

 

                    REJI YEE      Admitting Clinician Unavailable

 

                    CHRYSTAL NEWTON      Admitting Clinician Unavailable

 

                    MONDAYSATYA Admitting Clinician Unavailable

 

                    MondaySatya Admitting Clinician [phone]









Payers







           Payer Name Policy Type Policy Number Effective Date Expiration Date GAVIOTA GUADALUPE II              [id number] 2021            



                                            00:00:00              

 

           [employer] [place]            [id number]  2022-10-01            



                                            00:00:00              

 

           [facility]            [id number]  2017            



           MEDICAID                         00:00:00              







Problems







       Condition Condition Condition Status Onset  Resolution Last   Treating Co

mments 

Source



       Name   Details Category        Date   Date   Treatment Clinician        



                                                 Date                 

 

       CONVULSION  CONVULSIO Diagnosis Active 2021          

     Tom



              N Active               3-25          05:07:00               l



              2021               00:00:                             Pipe anthony



              [address]                                                  

 

       No known No known Disease                                           Unive

rs



       active active                                                  ity of



       problems problems                                                  [facility]

 

       Convulsion Convulsion Problem Active                                    U

T



       s,     s,                                                      Physici



       unspecifie unspecifie                                                  an

s



       d      d                                                       



       convulsion convulsion                                                  



       type   type                                                    

 

       Partial Partial Problem Active                                    UT



       symptomati symptomati                                                  Ph

ysici



       c epilepsy c epilepsy                                                  an

s



       with   with                                                    



       complex complex                                                  



       partial partial                                                  



       seizures, seizures,                                                  



       not    not                                                     



       intractabl intractabl                                                  



       e, without e, without                                                  



       status status                                                  



       epilepticu epilepticu                                                  



       s      s                                                       

 

       Psychogeni Psychogeni Problem Active                                    U

T



       c      c                                                       Physici



       nonepilept nonepilept                                                  an

s



       ic seizure ic seizure                                                  







Allergies, Adverse Reactions, Alerts







       Allergy Allergy Status Severity Reaction(s) Onset  Inactive Treating Comm

ents 

Source



       Name   Type                        Date   Date   Clinician        

 

       NO KNOWN Drug   Active                                           [facility]



       ALLERGIE Class                                                   [facility]



       S                                                              [facility]







Family History







           Family Member Diagnosis  Comments   Start Date Stop Date  Source

 

           Grandmother Family history of                                  [facility]

[facility]



                      hypertension                                  

 

           Grandmother Family history of                                  [facility]

[facility]



                      cerebrovascular accident                                  



                      (CVA)                                       







Social History







           Social Habit Start Date Stop Date  Quantity   Comments   Source

 

           Exposure to                       Not sure              [facility]



           SARS-CoV-2                                             [facility]



           (event)                                                [place]

 

           Alcohol intake 2021 [facility]



                      00:00:00   00:00:00   non-drinker of            [place] [facility]

[facility]



                                            alcohol (finding)            [place]

 

           Tobacco use and 2019 Smokeless tobacco            Un

iversity of



           exposure   00:00:00   00:00:00   non-user              [facility]

 

           Sex Assigned At 2000                       [facility]

y of



           Birth      00:00:00   00:00:00                         [facility]









                Smoking Status  Start Date      Stop Date       Source

 

                Social History                                  [facility]







Medications







       Ordered Filled Start  Stop   Current Ordering Indication Dosage Frequency

 Signature

                    Comments            Components          Source



     Medication Medication Date Date Medication? Clinician                (SIG) 

          



     Name Name                                                   

 

     benzonatate      2021      Yes       [id number] 100mg      Take 1          

 [facility]



     100 mg      1-30                               capsule by           ity of



     capsule      00:00:                               mouth 3           [place]



               00                                 (three)           Medical



                                                  times           Branch



                                                  daily as           



                                                  needed for           



                                                  Cough.           

 

     benzonatate      2021      Yes       [id number] 100mg      Take 1          

 [facility]



     100 mg      1-30                               capsule by           ity of



     capsule      00:00:                               mouth 3           [place]



               00                                 (three)           Medical



                                                  times           Branch



                                                  daily as           



                                                  needed for           



                                                  Cough.           

 

     benzonatate      2021      Yes       [id number] 100mg      Take 1          

 [facility]



     100 mg      1-30                               capsule by           ity of



     capsule      00:00:                               mouth 3           [place]



               00                                 (three)           Medical



                                                  times           Branch



                                                  daily as           



                                                  needed for           



                                                  Cough.           

 

     Saline            No                       Notes:           [facility]



     Flush 0.9%      5-02                               Same as:           l



               02:00:                               BD             [address]



               00                                 Posiflush           



                                                  [place]           

 

     Saline            No                       Notes:           [facility]



     Flush 0.9%      5-02                               Same as:           l



               02:00:                               BD             [facility]



               00                                 Posiflush           



                                                  [place]           

 

     Saline            No                       Notes:           [facility]



     Flush 0.9%      5-02                               Same as:           l



               02:00:                               BD             [facility]



               00                                 Posiflush           



                                                  [place]           

 

     Saline            No                       Notes:           [facility]



     Flush 0.9%      5-01                               Same as:           l



               15:58:                               BD             [facility]



               00                                 Posiflush           



                                                  [place]           

 

     Saline            No                       Notes:           [facility]



     Flush 0.9%      5-01                               Same as:           l



               15:58:                               BD             [place]



               00                                 Posiflush           



                                                  [place]           

 

     Saline            No                       Notes:           [facility]



     Flush 0.9%      5-01                               Same as:           l



               15:58:                               BD             [place]



               00                                 Posiflush           



                                                  [place]           

 

     medroxyPROG            Yes                      GIVE 1 ML           U

nivers



     ESTERone      3-26                               IM IN LEFT           ity o

f



     150 mg/mL      00:00:                               DELTOID OF           Te

[place]



     syringe      00                                 ARM- BRING           Medica

l



                                                  TO [facility]           Branch

 

     medroxyPROG            Yes                      GIVE 1 ML           U

nivers



     ESTERone      3-26                               IM IN LEFT           ity o

f



     150 mg/mL      00:00:                               DELTOID OF           Te

[facility]as



     syringe      00                                 ARM- BRING           Medica

l



                                                  TO [facility]           Branch

 

     medroxyPROG            Yes                      GIVE 1 ML           U

nivers



     ESTERone      3-26                               IM IN LEFT           ity o

f



     150 mg/mL      00:00:                               DELTOID OF           Te

xas



     syringe      00                                 ARM- BRING           Medica

l



                                                  TO [facility]           Branch

 

     medroxyPROG      2019-0      Yes                      GIVE 1 ML           U

nivers



     ESTERone      3-26                               IM IN LEFT           ity o

f



     150 mg/mL      00:00:                               DELTOID OF           Te

[facility]



     syringe      00                                 ARM- BRING           [facility]

l



                                                  TO CL[facility]           Branch

 

     medroxyPROG      2019-0      Yes                      GIVE 1 ML           U

nivers



     ESTERone      3-26                               IM IN LEFT           ity o

f



     150 mg/mL      00:00:                               DELTOID OF           Te

xas



     syringe      00                                 ARM- BRING           Medica

l



                                                  TO [facility]           Branch

 

     levETIRAcet levETIRAcet           Yes  LVN       1    Q0.5D TAKE 1         

  [facility]



     am 500 MG am 500 MG                                    TABLET           Phy

sici



     Oral Tablet Oral Tablet                                    TWICE           

ans



                                                  DAILY AS           



                                                  DIRECTED           







Immunizations







           Ordered    Filled Immunization Date       Status     Comments   [facility]

e



           Immunization Name Name                                        

 

           SARS-COV-2 COVID-19            2021 Completed             Unive

rsity of



           PFIZER VACCINE            00:00:00                         [facility]

 

           SARS-COV-2 COVID-19            2021 Completed             Unive

rsity of



           PFIZER VACCINE            00:00:00                         [facility]

 

           SARS-COV-2 COVID-19            2021 Completed             Unive

rsity of



           PFIZER VACCINE            00:00:00                         [facility]

 

           SARS-COV-2 COVID-19            2021 Completed             Unive

rsity of



           PFIZER VACCINE            00:00:00                         [facility]

 

           SARS-COV-2 COVID-19            2021 Completed             Unive

rsity of



           PFIZER VACCINE            00:00:00                         [facility]

 

           SARS-COV-2 COVID-19            2021 Completed             Unive

rsity of



           PFIZER VACCINE            00:00:00                         [facility]

 

           SARS-COV-2 COVID-19            2021 Completed             Unive

rsity of



           PFIZER VACCINE            00:00:00                         [facility]

 

           SARS-COV-2 COVID-19            2021 Completed             Unive

rsity of



           PFIZER VACCINE            00:00:00                         [facility]

 

           SARS-COV-2 COVID-19            2021 Completed             Unive

rsity of



           PFIZER VACCINE            00:00:00                         [facility]

 

           SARS-COV-2 COVID-19            2021 Completed             Unive

rsity of



           PFIZER VACCINE            00:00:00                         [place] [facility]

[facility]



                                                                  Br[facility]







Vital Signs







             Vital Name   Observation Time Observation Value Comments     Source

 

             Systolic blood 2021   137 mm[Hg]                [facility]



             pressure     19:22:00                               [facility]

 

             Diastolic blood 2021   91 mm[Hg]                 University [facility]

f



             pressure     19:22:00                               [facility]

 

             Heart rate   2021   81 /min                   [facility]



                          19:22:00                               [facility]

 

             Body temperature 2021   36.5 Roberta                  [facility]



                          19:22:00                               [facility]

 

             Respiratory rate 2021   20 /min                   [facility]



                          19:22:00                               [facility]

 

             Body weight  2021   69.854 kg                 [facility]



                          19:22:00                               [facility]

 

             Oxygen saturation 2021   100 /min                  [facility]



             in Arterial blood 19:22:00                               [place] [facility]

[facility]



             by Pulse oximetry                                        [facility]

 

             Temperature Oral 2021   98.9 F                    [facility]

[facility]



             (F)          14:00:00                               

 

             Heart Rate   2021                             [facility]

n



                          14:00:00                               

 

             Respitory Rate 2021                             [facility]

[facility]



                          14:00:00                               

 

             Systolic (mm Hg) 2021                             [facility]

[facility]



                          14:00:00                               

 

             Diastolic (mm Hg) 2021                             [facility]

[facility]



                          14:00:00                               

 

             Temperature Oral 2021   98.9 F                    [facility]

[facility]



             (F)          01:00:00                               

 

             Heart Rate   2021                             [facility]

n



                          01:00:00                               

 

             Respitory Rate 2021                             [facility]

[facility]



                          01:00:00                               

 

             Systolic (mm Hg) 2021                             [facility]

[facility]



                          01:00:00                               

 

             Diastolic (mm Hg) 2021                             [facility]

ermann



                          01:00:00                               

 

             Temperature Oral 2021   99 F                      [facility]

[facility]



             (F)          19:00:00                               

 

             Heart Rate   2021                             [facility]

n



                          19:00:00                               

 

             Respitory Rate 2021                             [facility]

[facility]



                          19:00:00                               

 

             Systolic (mm Hg) 2021                             [facility]



                          19:00:00                               

 

             Diastolic (mm Hg) 2021                             [facility]

ermann



                          19:00:00                               

 

             Height       2021   162.56 cm                 [facility]

[facility]



                          14:55:00                               

 

             Weight       2021                             [facility]

n



                          14:55:00                               

 

             BMI Calculated 2021                             [facility]

[facility]



                          14:55:00                               

 

             Body temperature 2021-03-10   97.3 [degF]  Method:      [facility]

ns



                          09:44:00                  Temporal     

 

             Heart Rate   2021-03-10   92 /min                   [facility]



                          09:44:00                               

 

             Systolic blood 2021-03-10   119 mm[Hg]   Location: [facility]

ns



             pressure     09:44:00                  Position:    



                                                    Sitting      

 

             Diastolic blood 2021-03-10   80 mm[Hg]    Location: [facility]; [facility]

ans



             pressure     09:44:00                  Position:    



                                                    Sitting      

 

             Body height  2021-03-10   65 [in_us]                [facility]



                          09:44:00                               

 

             Weight       2021-03-10   156.5 [lb_av]              [facility]



                          09:44:00                               

 

             Body mass index 2021-03-10   26.04 kg/m2               [facility]

s



             (BMI) [Ratio] 09:44:00                               







Procedures







                Procedure       Date / Time     Performing Clinician Source



                                Performed                       

 

                US RETROPERITONEAL 2022-10-28 16:40:50 Reji Yee  [facility]                                        Medical Branch

 

                NOTICE OF PRIVACY 2022-10-28 16:09:10 Doctor Erik, [facility]



                PRACTICES                       [facility]

 

                CONSENT/REFUSAL FOR 2022-10-28 16:08:48 Doctor Erik, [facility]



                DIAGNOSIS AND TREATMENT                 [facility]         [facility] 

Branch

 

                ASSIGNMENT OF BENEFITS 2022-10-28 16:08:32 [facility]



                                                [facility]

 

                CT HEAD WO CONTRAST 2022-10-03 16:04:41 Reji Yee  [facility]



                                                                Medical Branch

 

                XR CHEST 1 VW   2021 19:51:00 Chrystal Newton [facility]

[facility]



                                                                Medical Branch

 

                CONSENT/REFUSAL FOR 2021 19:12:50 [facility]

[facility]



                DIAGNOSIS AND TREATMENT                 [facility] 

Branch

 

                SARS-COV-2 COVID-19 2021 22:34:51 [facility], [facility]

[facility]



                VACCINE,0.3ML,IM (PFIZER)                 No Name         Medica

l Branch

 

                [UTP] Neuro EMU 2021-04-15 00:00:00                 [facility]

s

 

                [UTP] Neuro EMU 2021 00:00:00                 [facility]

s

 

                History of Tonsillectomy                                 [facility]

[facility]







Plan of Care







             Planned Activity Planned Date Details      Comments     Source

 

             Diagnostic Test Pending 2021-04-15 00:00:00 [UTP] Neuro EMU        

      [facility]



                                       [code = [UTP] Neuro              



                                       EMU]                      

 

             Diagnostic Test Pending 2021-04-15 00:00:00 [UTP] Neuro EMU        

      [facility]



                                       [code = [UTP] Neuro              



                                       EMU]                      

 

             Diagnostic Test Pending 2021-04-15 00:00:00 [UTP] Neuro EMU        

      [facility]



                                       [code = [UTP] Neuro              



                                       EMU]                      

 

             Diagnostic Test Pending 2021-04-15 00:00:00 [UTP] Neuro EMU        

      [facility]



                                       [code = [UTP] Neuro              



                                       EMU]                      

 

             Diagnostic Test Pending 2021 00:00:00 [UTP] Neuro EMU        

      [facility]



                                       [code = [UTP] Neuro              



                                       EMU]                      

 

             Diagnostic Test Pending 2021 00:00:00 [UTP] Neuro EMU        

      [facility]



                                       [code = [UTP] Neuro              



                                       EMU]                      

 

             Diagnostic Test Pending 2021 00:00:00 [UTP] Neuro EMU        

      [facility]



                                       [code = [UTP] Neuro              



                                       EMU]                      

 

             Diagnostic Test Pending 2021 00:00:00 [UTP] Neuro EMU        

      [facility]



                                       [code = [UTP] Neuro              



                                       EMU]                      

 

             Diagnostic Test Pending 2021 00:00:00 [UTP] Neuro EMU        

      [facility]



                                       [code = [UTP] Neuro              



                                       EMU]                      

 

             Diagnostic Test Pending 2021 00:00:00 [UTP] Neuro EMU        

      [facility]



                                       [code = [UTP] Neuro              



                                       EMU]                      







Encounters







        Start   End     Encounter Admission Attending Care    Care    Encounter 

Source



        Date/Time Date/Time Type    Type    Clinicians Facility Department ID   

   

 

        2021         Emergency                 [facility]    [id number] 

Univers



        16:50:45                                                         ity of



                                                                        [facility]

 

        2022-10-28 2022-10-28 Outpatient R       RADIOLOGY [facility]    [id number]

[id number] Univers



        11:03:42 23:59:00                                                 ity of



                                                                        [facility]

 

        2022-10-28 2022-10-28 Hospital         Radiology [facility]    [id number]

[id number] Univers



        11:03:42 23:59:00 Encounter                 ANGLETON [id number]        

 ity 



                                                RAMÓN [id number]         [facility]  [phone]         Medi

amrik



                                                        806             Branch

 

        2022-10-03 2022-10-03 Outpatient R       RADIOLOGY [facility]    [id number]

[id number] Univers



        10:40:55 23:59:00                                                 ity of



                                                                        [facility]

 

        2022-10-03 2022-10-03 Hospital         Radiology [facility]    [id number]

[id number] Univers



        10:40:55 23:59:00 Encounter                 .1.13.10        

 ity [facility] [id number]         [place]

[place]



                                                CAMPUS  [phone]         Medi

amrik



                                                        801             Branch

 

        2021 Emergency X       LAMAR  [facility]    ERT     [id number]

[id number] Univers



        13:23:00 15:46:00                 [facility]

 

        [facility]    [id number]

[id number] Univers



        13:23:00 15:46:00                 Chrystal S .1.13.10         i

ty [facility] [id number]         [place]

[place]



                                                CAMPUS  [phone]         Medi

amrik



                                                        084             Branch

 

        2021 Outpatient RERE KEENAN [facility]    [id number]

[id number] Univers



        20:40:00 20:40:00                 ATTENDING                         ity 

[facility]

 

        2021 Telephone         MIKE Bess    [id number]

[id number] Univers



        00:00:00 00:00:00                 Natalie ALBRIGHT   [id number]       

  ity of



                                                [facility] [place] [id number]         Shree

as



                                                        [phone]         Medi

amrik



                                                        019             Branch

 

        2021 Imm/Inj         Nurse, Adc Pob Immunization [facility]  

  [id number] 

[id number]                                Univers



        17:40:00 17:50:00 Visit           Steven Malone 350.1.13

.10         ity [facility] [id number]         [place]

[place]



                                                [employer] [phone]         Me

dical



                                                [facility]     421             [facility]                 

 

        2021 Outpatient RERE MALONE  [facility]    [id number]

[id number] Univers



        17:40:00 17:40:00                 STEVEN yu [facility]

 

        2021 [facility]    [id number]

[id number] Univers



        17:50:00 17:50:00                 [facility]

 

        2021 Outpatient                 [facility]    [id number]

[id number] Univers



        11:30:00 11:30:00                                                 [facility]

 

        [facility]    [id number]

[id number] Univers



        17:00:00 17:00:00                 [facility]

 

        2021 [facility]    [id number]

[id number] Univers



        14:30:00 14:30:00                 [facility]

 

        2021 Outpatient                 [facility]    [id number]

[id number] Univers



        17:50:00 17:50:00                                                 [facility]

 

        [facility] - 743

[id number] [place]



        16:30:00 16:30:00 t; SATYA DUCKWORTH [place] [facility]IM[facility], M.D.            Medical         ans



                        MTABATHA                            [place]          

 

        2021 Inpatient                 [facility] [id number]

[id number] Memoria



        15:02:00 21:20:00                         [address]

 

        2021 Inpatient                 [facility] [id number]

[id number] Mem[facility]



        15:02:00 21:20:00                         [address]

 

        2021 Inpatient         MONDAY, [facility]    [id number]    

[facility]



        10:02:00 16:20:00                 SATYA                         

 

        2021 Outpatient         Monday, [facility]   [id number]

[id number] 



        10:02:00 16:20:00                 Satya                       



                                        [place]                         

 

        2021 Outpatient         Monday, [facility]   [id number]

[id number] 



        10:02:00 16:20:00                 Satya                       



                                        [place]                         

 

        2021-03-10 2021-03-10 [facility] - 716

[id number] [place]



        09:30:00 09:30:00 t; SATYA DUCKWORTH [place] [facility] KIM[facility], M.D.            Medical         usama VELA                            [place]          

 

        2020 [facility] R       HAMMAD  [facility]    [id number]

[id number] Univers



        15:40:00 15:40:00                 ANJALI yu [facility]







Results







           Test Description Test Time  Test Comments Results    Result Comments 

Source









                    URINE CHEM          2021 22:15:00 









                      Test Item  Value      Reference Range Interpretation Comme

nts









             U Preg (test code = U Preg) Negative (21 5:15 PM)              

             



[facility] [id number] 22:15:00





             Test Item    Value        Reference Range Interpretation Comments

 

             U Preg (test code = U Negative (21 5:15                        

   



             Preg)        PM)                                    



[facility] [id number] 22[facility]15:00





             Test Item    Value        Reference Range Interpretation Comments

 

             U Preg (test code = U Negative (21 5:15                        

   



             Preg)        PM)                                    



[facility] CenterKltsevtJAXDIVSBOW0856-95-77 20:25:00





             Test Item    Value        Reference Range Interpretation Comments

 

             Hgb (test code = Hgb) 14.7         12.0-16.0                 



[facility] CenterMcbzglkDXPVNJBXVY2953-48-32 20:25:00





             Test Item    Value        Reference Range Interpretation Comments

 

             Hct (test code = Hct) 43.5         36.0-48.0                 



[facility] ClareFoatoloGJFVNAYBHY2084-68-66 20:25:00





             Test Item    Value        Reference Range Interpretation Comments

 

             MCV (test code = MCV) 95.9         80.0-98.0                 



[facility] ClareCcnayklPJGTNBQSHS7564-92-59 20:25:00





             Test Item    Value        Reference Range Interpretation Comments

 

             MCH (test code = MCH) 32.4 pg      27.0-31.0                 



[facility] CenterPzftrxuVKUKGPXHFN1302-09-82 20:25:00





             Test Item    Value        Reference Range Interpretation Comments

 

             MCHC (test code = MCHC) 33.8         32.0-36.0                 



[facility] [id number] 20:25:00





             Test Item    Value        Reference Range Interpretation Comments

 

             RDW (test code = RDW) 12.6         11.5-14.5                 



[facility] ClareQynupzcCLVKHIWQEJ6001-64-88 20:25:00





             Test Item    Value        Reference Range Interpretation Comments

 

             Platelet (test code = Platelet) 206          133-450               

    



[facility] HospitalAcseevnUJQJDFTROU7571-57-86 20:25:00





             Test Item    Value        Reference Range Interpretation Comments

 

             MPV (test code = MPV) 9.1          7.4-10.4                  



[place] [id number] 20:25:00





             Test Item    Value        Reference Range Interpretation Comments

 

             Bili Direct (test code no gt        See_Comment                [Aut

omated message] The



             = Bili Direct)                                        system which 

generated



                                                                 this result tra

nsmitted



                                                                 reference range

: <=0.3.



                                                                 The reference r

aniceto was



                                                                 not used to int

erpret this



                                                                 result as kye

l/abnormal.



[place] [id number] 20:25:00





             Test Item    Value        Reference Range Interpretation Comments

 

             Glucose Lvl (test code = Glucose Lvl) 83           70-99           

          



[place] [id number] 20:25:00





             Test Item    Value        Reference Range Interpretation Comments

 

             BUN (test code = BUN) 13           7-22                      



[place] [id number] 20:25:00





             Test Item    Value        Reference Range Interpretation Comments

 

             Creatinine Lvl (test code = Creatinine 1.02         0.50-1.40      

           



             Lvl)                                                



[place] [id number] 20:25:00





             Test Item    Value        Reference Range Interpretation Comments

 

             Sodium Lvl (test code = Sodium Lvl) 139          135-145           

        



[place] [id number] 20:25:00





             Test Item    Value        Reference Range Interpretation Comments

 

             Potassium Lvl (test code = Potassium 4.0          3.5-5.1          

         



             Lvl)                                                



[place] [id number] 20:25:00





             Test Item    Value        Reference Range Interpretation Comments

 

             Chloride Lvl (test code = Chloride Lvl) 110                  

            



[place] [id number] 20:25:00





             Test Item    Value        Reference Range Interpretation Comments

 

             CO2 (test code = CO2) 24           24-32                     



[place] [id number] 20:25:00





             Test Item    Value        Reference Range Interpretation Comments

 

             Calcium Lvl (test code = Calcium Lvl) 9.0          8.5-10.5        

          



[place] [id number] 20:25:00





             Test Item    Value        Reference Range Interpretation Comments

 

             Total Protein (test code = Total 7.7          6.4-8.4              

     



             Protein)                                            



[place] [id number] 20:25:00





             Test Item    Value        Reference Range Interpretation Comments

 

             Albumin Lvl (test code = Albumin Lvl) 4.1          3.5-5.0         

          



[facility] [id number] 20:25:00





             Test Item    Value        Reference Range Interpretation Comments

 

             ALT (test code = ALT) 18           See_Comment                [Auto

mated message] The



                                                                 system which ge

nerated this



                                                                 result transmit

[place]



                                                                 reference range

: <=65. The



                                                                 reference range

 was not



                                                                 used to interpr

et this



                                                                 result as kye

l/abnormal.



[facility] [id number] 20:25:00





             Test Item    Value        Reference Range Interpretation Comments

 

             AST (test code = AST) 12           See_Comment                [Auto

mated message] The



                                                                 system which ge

nerated this



                                                                 result transmit

[place]



                                                                 reference range

: <=37. The



                                                                 reference range

 was not



                                                                 used to interpr

et this



                                                                 result as kye

l/abnormal.



[facility] [id number] 20:25:00





             Test Item    Value        Reference Range Interpretation Comments

 

             Alk Phos (test code = Alk Phos) 106          43-86                 

    



[facility] [id number] 20:25:00





             Test Item    Value        Reference Range Interpretation Comments

 

             Bili Total (test code = Bili Total) 0.3          0.2-1.3           

        



[facility] [id number] 20:25:00





             Test Item    Value        Reference Range Interpretation Comments

 

             AGAP (test code = AGAP) 9.0          10.0-20.0                 



[facility] [id number] 20:25:00





             Test Item    Value        Reference Range Interpretation Comments

 

             B/C Ratio (test code = B/C Ratio) 13 1         6-25                

      



[facility] [id number] 20:25:00





             Test Item    Value        Reference Range Interpretation Comments

 

             Globulin (test code = Globulin) 3.6          2.7-4.2               

    



[facility] [id number] 20:25:00





             Test Item    Value        Reference Range Interpretation Comments

 

             A/G Ratio (test code = A/G Ratio) 1.1 1        0.7-1.6             

      



[facility] [id number] 20:25:00





             Test Item    Value        Reference Range Interpretation Comments

 

             eGFR (test code = eGFR) 80                                     



[facility] Center[facility]uzpogdMGFPQEBPIO3063-29-65 20:25:00





             Test Item    Value        Reference Range Interpretation Comments

 

             Segs (test code = Segs) 47.7         45.0-75.0                 



[place] [id number] 20:25:00





             Test Item    Value        Reference Range Interpretation Comments

 

             Lymphocytes (test code = Lymphocytes) 41.3         20.0-40.0       

          



[place] [id number] 20:25:00





             Test Item    Value        Reference Range Interpretation Comments

 

             Monocytes (test code = Monocytes) 9.0          2.0-12.0            

      



[place] [id number] 20:25:00





             Test Item    Value        Reference Range Interpretation Comments

 

             Eosinophils (test code = 1.5          See_Comment                [A

utomated message] The



             Eosinophils)                                        system which ge

nerated



                                                                 this result tra

nsmitted



                                                                 reference range

: <=4.0.



                                                                 The reference r

aniceto was



                                                                 not used to int

erpret



                                                                 this result as



                                                                 normal/abnormal

.



[place] [id number] 20:25:00





             Test Item    Value        Reference Range Interpretation Comments

 

             Basophils (test code = 0.5          See_Comment                [Aut

omated message] The



             Basophils)                                          system which ge

nerated



                                                                 this result tra

nsmitted



                                                                 reference range

: <=1.0.



                                                                 The reference r

aniceto was



                                                                 not used to int

erpret



                                                                 this result as



                                                                 normal/abnormal

.



[facility] H[id number] 20:25:00





             Test Item    Value        Reference Range Interpretation Comments

 

             Neutrophils # (test code = Neutrophils 3.3          1.5-8.1        

           



             #)                                                  



[place] [id number] 20:25:00





             Test Item    Value        Reference Range Interpretation Comments

 

             Lymphocytes # (test code = Lymphocytes 2.9          1.0-5.5        

           



             #)                                                  



[place] [id number] 20:25:00





             Test Item    Value        Reference Range Interpretation Comments

 

             Monocytes # (test code 0.6          See_Comment                [Aut

omated message] The



             = Monocytes #)                                        system which 

generated



                                                                 this result tra

nsmitted



                                                                 reference range

: <=0.8.



                                                                 The reference r

aniceto was



                                                                 not used to int

erpret



                                                                 this result as



                                                                 normal/abnormal

.



[place]241-05-01 20:25:00





             Test Item    Value        Reference Range Interpretation Comments

 

             Eosinophils # (test code 0.1          See_Comment                [A

utomated message] The



             = Eosinophils #)                                        system whic

h generated



                                                                 this result tra

nsmitted



                                                                 reference range

: <=0.5.



                                                                 The reference r

aniceto was



                                                                 not used to int

erpret



                                                                 this result as



                                                                 normal/abnormal

.



[place] 329241-05-01 20:25:00





             Test Item    Value        Reference Range Interpretation Comments

 

             WBC (test code = WBC) 6.9          3.7-10.4                  



[place] 329241-05-01 20:25:00





             Test Item    Value        Reference Range Interpretation Comments

 

             RBC (test code = RBC) 4.54         4.20-5.40                 



[place] 329241-05-01 20:25:00





             Test Item    Value        Reference Range Interpretation Comments

 

             Hgb (test code = Hgb) 14.7         12.0-16.0                 



[place] 729751-05-01 20:25:00





             Test Item    Value        Reference Range Interpretation Comments

 

             BUN (test code = BUN) 13           7-22                      



[place] 729751-05-01 20:25:00





             Test Item    Value        Reference Range Interpretation Comments

 

             Creatinine Lvl (test code = Creatinine 1.02         0.50-1.40      

           



             Lvl)                                                



[place] 729751-05-01 20:25:00





             Test Item    Value        Reference Range Interpretation Comments

 

             Sodium Lvl (test code = Sodium Lvl) 139          135-145           

        



[place] 729751-05-01 20:25:00





             Test Item    Value        Reference Range Interpretation Comments

 

             Potassium Lvl (test code = Potassium 4.0          3.5-5.1          

         



             Lvl)                                                



[place] 729751-05-01 20:25:00





             Test Item    Value        Reference Range Interpretation Comments

 

             Chloride Lvl (test code = Chloride Lvl) 110                  

            



[place] 729751-05-01 20:25:00





             Test Item    Value        Reference Range Interpretation Comments

 

             CO2 (test code = CO2) 24           24-32                     



[place] 729751-05-01 20:25:00





             Test Item    Value        Reference Range Interpretation Comments

 

             Calcium Lvl (test code = Calcium Lvl) 9.0          8.5-10.5        

          



[place] 729751-05-01 20:25:00





             Test Item    Value        Reference Range Interpretation Comments

 

             Total Protein (test code = Total 7.7          6.4-8.4              

     



             Protein)                                            



[place] 729751-05-01 20:25:00





             Test Item    Value        Reference Range Interpretation Comments

 

             Albumin Lvl (test code = Albumin Lvl) 4.1          3.5-5.0         

          



[place] 729751-05-01 20:25:00





             Test Item    Value        Reference Range Interpretation Comments

 

             ALT (test code = ALT) 18           See_Comment                [Auto

mated message] The



                                                                 system which ge

nerated this



                                                                 result transmit

[place]



                                                                 reference range

: <=65. The



                                                                 reference range

 was not



                                                                 used to interpr

et this



                                                                 result as kye

l/abnormal.



[facility] [employer] DA [id number] 20:25:00





             Test Item    Value        Reference Range Interpretation Comments

 

             AST (test code = AST) 12           See_Comment                [Auto

mated message] The



                                                                 system which ge

nerated this



                                                                 result transmit

[place]



                                                                 reference range

: <=37. The



                                                                 reference range

 was not



                                                                 used to interpr

et this



                                                                 result as kye

l/abnormal.



[facility] [id number] 20:25:00





             Test Item    Value        Reference Range Interpretation Comments

 

             Alk Phos (test code = Alk Phos) 106          43-86                 

    



[facility] [id number] 20:25:00





             Test Item    Value        Reference Range Interpretation Comments

 

             Bili Total (test code = Bili Total) 0.3          0.2-1.3           

        



[facility] [id number] 20:25:00





             Test Item    Value        Reference Range Interpretation Comments

 

             AGAP (test code = AGAP) 9.0          10.0-20.0                 



[facility] [id number] 20:25:00





             Test Item    Value        Reference Range Interpretation Comments

 

             B/C Ratio (test code = B/C Ratio) 13 1         6-25                

      



[facility] [id number] 20:25:00





             Test Item    Value        Reference Range Interpretation Comments

 

             Globulin (test code = Globulin) 3.6          2.7-4.2               

    



[facility] [id number] 20:25:00





             Test Item    Value        Reference Range Interpretation Comments

 

             A/G Ratio (test code = A/G Ratio) 1.1 1        0.7-1.6             

      



[facility] [id number] 20:25:00





             Test Item    Value        Reference Range Interpretation Comments

 

             eGFR (test code = eGFR) 80                                     



[facility] [id number] 20:25:00





             Test Item    Value        Reference Range Interpretation Comments

 

             Segs (test code = Segs) 47.7         45.0-75.0                 



[facility] [id number] 20:25:00





             Test Item    Value        Reference Range Interpretation Comments

 

             Lymphocytes (test code = Lymphocytes) 41.3         20.0-40.0       

          



[facility] [id number] 20:25:00





             Test Item    Value        Reference Range Interpretation Comments

 

             Monocytes (test code = Monocytes) 9.0          2.0-12.0            

      



[place] 329241-05-01 20:25:00





             Test Item    Value        Reference Range Interpretation Comments

 

             Eosinophils (test code = 1.5          See_Comment                [A

utomated message] The



             Eosinophils)                                        system which ge

nerated



                                                                 this result tra

nsmitted



                                                                 reference range

: <=4.0.



                                                                 The reference r

aniceto was



                                                                 not used to int

erpret



                                                                 this result as



                                                                 normal/abnormal

.



[place] 329241-05-01 20:25:00





             Test Item    Value        Reference Range Interpretation Comments

 

             Basophils (test code = 0.5          See_Comment                [Aut

omated message] The



             Basophils)                                          system which ge

nerated



                                                                 this result tra

nsmitted



                                                                 reference range

: <=1.0.



                                                                 The reference r

aniceto was



                                                                 not used to int

erpret



                                                                 this result as



                                                                 normal/abnormal

.



[place] 329241-05-01 20:25:00





             Test Item    Value        Reference Range Interpretation Comments

 

             Neutrophils # (test code = Neutrophils 3.3          1.5-8.1        

           



             #)                                                  



[place] 329241-05-01 20:25:00





             Test Item    Value        Reference Range Interpretation Comments

 

             Lymphocytes # (test code = Lymphocytes 2.9          1.0-5.5        

           



             #)                                                  



[place] 329241-05-01 20:25:00





             Test Item    Value        Reference Range Interpretation Comments

 

             Monocytes # (test code 0.6          See_Comment                [Aut

omated message] The



             = Monocytes #)                                        system which 

generated



                                                                 this result tra

nsmitted



                                                                 reference range

: <=0.8.



                                                                 The reference r

aniceto was



                                                                 not used to int

erpret



                                                                 this result as



                                                                 normal/abnormal

.



[place] [address]-05-01 20:25:00





             Test Item    Value        Reference Range Interpretation Comments

 

             Eosinophils # (test code 0.1          See_Comment                [A

utomated message] The



             = Eosinophils #)                                        system whic

h generated



                                                                 this result tra

nsmitted



                                                                 reference range

: <=0.5.



                                                                 The reference r

aniceto was



                                                                 not used to int

erpret



                                                                 this result as



                                                                 normal/abnormal

.



[place] 329241-05-01 20:25:00





             Test Item    Value        Reference Range Interpretation Comments

 

             WBC (test code = WBC) 6.9          3.7-10.4                  



[place] 329241-05-01 20:25:00





             Test Item    Value        Reference Range Interpretation Comments

 

             RBC (test code = RBC) 4.54         4.20-5.40                 



[place] [address]-05-01 20:25:00





             Test Item    Value        Reference Range Interpretation Comments

 

             Hgb (test code = Hgb) 14.7         12.0-16.0                 



[place] [address]-05-01 20:25:00





             Test Item    Value        Reference Range Interpretation Comments

 

             Hct (test code = Hct) 43.5         36.0-48.0                 



[place] [address]-05-01 20:25:00





             Test Item    Value        Reference Range Interpretation Comments

 

             MCV (test code = MCV) 95.9         80.0-98.0                 



[place] [address]-05-01 20:25:00





             Test Item    Value        Reference Range Interpretation Comments

 

             MCH (test code = MCH) 32.4 pg      27.0-31.0                 



[place] [address]-05-01 20:25:00





             Test Item    Value        Reference Range Interpretation Comments

 

             MCHC (test code = MCHC) 33.8         32.0-36.0                 



[place] [address]-05-01 20:25:00





             Test Item    Value        Reference Range Interpretation Comments

 

             RDW (test code = RDW) 12.6         11.5-14.5                 



[place] [address]-05-01 20:25:00





             Test Item    Value        Reference Range Interpretation Comments

 

             Platelet (test code = Platelet) 206          133-450               

    



[place] 329241-05-01 20:25:00





             Test Item    Value        Reference Range Interpretation Comments

 

             MPV (test code = MPV) 9.1          7.4-10.4                  



[place] 729751-05-01 20:25:00





             Test Item    Value        Reference Range Interpretation Comments

 

             Bili Direct (test code no gt        See_Comment                [Aut

omated message] The



             = Bili Direct)                                        system which 

generated



                                                                 this result tra

nsmitted



                                                                 reference range

: <=0.3.



                                                                 The reference r

aniceto was



                                                                 not used to int

erpret this



                                                                 result as kye

l/abnormal.



[facility] 20:25:00





             Test Item    Value        Reference Range Interpretation Comments

 

             Glucose Lvl (test code = Glucose Lvl) 83           70-99           

          



[place] [address]-05-01 20:25:00





             Test Item    Value        Reference Range Interpretation Comments

 

             Hct (test code = Hct) 43.5         36.0-48.0                 



[place] 329241-05-01 20:25:00





             Test Item    Value        Reference Range Interpretation Comments

 

             MCV (test code = MCV) 95.9         80.0-98.0                 



[place] 329241-05-01 20:25:00





             Test Item    Value        Reference Range Interpretation Comments

 

             MCH (test code = MCH) 32.4 pg      27.0-31.0                 



[place] 329241-05-01 20:25:00





             Test Item    Value        Reference Range Interpretation Comments

 

             MCHC (test code = MCHC) 33.8         32.0-36.0                 



[place] [address]-05-01 20:25:00





             Test Item    Value        Reference Range Interpretation Comments

 

             RDW (test code = RDW) 12.6         11.5-14.5                 



[place] 329241-05-01 20:25:00





             Test Item    Value        Reference Range Interpretation Comments

 

             Platelet (test code = Platelet) 206          133-450               

    



[place] 329241-05-01 20:25:00





             Test Item    Value        Reference Range Interpretation Comments

 

             MPV (test code = MPV) 9.1          7.4-10.4                  



[place] 729751-05-01 20:25:00





             Test Item    Value        Reference Range Interpretation Comments

 

             Bili Direct (test code no gt        See_Comment                [Aut

omated message] The



             = Bili Direct)                                        system which 

generated



                                                                 this result tra

nsmitted



                                                                 reference range

: <=0.3.



                                                                 The reference r

aniceto was



                                                                 not used to int

erpret this



                                                                 result as kye

l/abnormal.



[place] 729751-05-01 20:25:00





             Test Item    Value        Reference Range Interpretation Comments

 

             Glucose Lvl (test code = Glucose Lvl) 83           70-99           

          



[place] 729751-05-01 20:25:00





             Test Item    Value        Reference Range Interpretation Comments

 

             BUN (test code = BUN) 13           7-22                      



[place] 729751-05-01 20:25:00





             Test Item    Value        Reference Range Interpretation Comments

 

             Creatinine Lvl (test code = Creatinine 1.02         0.50-1.40      

           



             Lvl)                                                



[place] 729751-05-01 20:25:00





             Test Item    Value        Reference Range Interpretation Comments

 

             Sodium Lvl (test code = Sodium Lvl) 139          135-145           

        



[place] 729751-05-01 20:25:00





             Test Item    Value        Reference Range Interpretation Comments

 

             Potassium Lvl (test code = Potassium 4.0          3.5-5.1          

         



             Lvl)                                                



[place] 729751-05-01 20:25:00





             Test Item    Value        Reference Range Interpretation Comments

 

             Chloride Lvl (test code = Chloride Lvl) 110                  

            



[place] 729751-05-01 20:25:00





             Test Item    Value        Reference Range Interpretation Comments

 

             CO2 (test code = CO2) 24           24-32                     



[place] 729751-05-01 20:25:00





             Test Item    Value        Reference Range Interpretation Comments

 

             Calcium Lvl (test code = Calcium Lvl) 9.0          8.5-10.5        

          



[place] 729751-05-01 20:25:00





             Test Item    Value        Reference Range Interpretation Comments

 

             Total Protein (test code = Total 7.7          6.4-8.4              

     



             Protein)                                            



[place] 729751-05-01 20:25:00





             Test Item    Value        Reference Range Interpretation Comments

 

             Albumin Lvl (test code = Albumin Lvl) 4.1          3.5-5.0         

          



[place] 729751-05-01 20:25:00





             Test Item    Value        Reference Range Interpretation Comments

 

             ALT (test code = ALT) 18           See_Comment                [Auto

mated message] The



                                                                 system which ge

nerated this



                                                                 result transmit

[place]



                                                                 reference range

: <=65. The



                                                                 reference range

 was not



                                                                 used to interpr

et this



                                                                 result as kye

l/abnormal.



[place] 729751-05-01 20:25:00





             Test Item    Value        Reference Range Interpretation Comments

 

             AST (test code = AST) 12           See_Comment                [Auto

mated message] The



                                                                 system which ge

nerated this



                                                                 result transmit

[place]



                                                                 reference range

: <=37. The



                                                                 reference range

 was not



                                                                 used to interpr

et this



                                                                 result as kye

l/abnormal.



[place] 729751-05-01 20:25:00





             Test Item    Value        Reference Range Interpretation Comments

 

             Alk Phos (test code = Alk Phos) 106          43-86                 

    



[place] 729751-05-01 20:25:00





             Test Item    Value        Reference Range Interpretation Comments

 

             Bili Total (test code = Bili Total) 0.3          0.2-1.3           

        



[place] 729751-05-01 20:25:00





             Test Item    Value        Reference Range Interpretation Comments

 

             AGAP (test code = AGAP) 9.0          10.0-20.0                 



[address]05-01 20:25:00





             Test Item    Value        Reference Range Interpretation Comments

 

             B/C Ratio (test code = B/C Ratio) 13 1         6-25                

      



[address]05-01 20:25:00





             Test Item    Value        Reference Range Interpretation Comments

 

             Globulin (test code = Globulin) 3.6          2.7-4.2               

    



[place] [address]-05-01 20:25:00





             Test Item    Value        Reference Range Interpretation Comments

 

             A/G Ratio (test code = A/G Ratio) 1.1 1        0.7-1.6             

      



[address]05-01 20:25:00





             Test Item    Value        Reference Range Interpretation Comments

 

             eGFR (test code = eGFR) 80                                     



[place] 329241-05-01 20:25:00





             Test Item    Value        Reference Range Interpretation Comments

 

             Segs (test code = Segs) 47.7         45.0-75.0                 



[place] 329241-05-01 20:25:00





             Test Item    Value        Reference Range Interpretation Comments

 

             Lymphocytes (test code = Lymphocytes) 41.3         20.0-40.0       

          



[place] [address]-05-01 20:25:00





             Test Item    Value        Reference Range Interpretation Comments

 

             Monocytes (test code = Monocytes) 9.0          2.0-12.0            

      



[place] [address]-05-01 20:25:00





             Test Item    Value        Reference Range Interpretation Comments

 

             Eosinophils (test code = 1.5          See_Comment                [A

utomated message] The



             Eosinophils)                                        system which ge

nerated



                                                                 this result tra

nsmitted



                                                                 reference range

: <=4.0.



                                                                 The reference r

aniceto was



                                                                 not used to int

erpret



                                                                 this result as



                                                                 normal/abnormal

.



[place] [address]-05-01 20:25:00





             Test Item    Value        Reference Range Interpretation Comments

 

             Basophils (test code = 0.5          See_Comment                [Aut

omated message] The



             Basophils)                                          system which ge

nerated



                                                                 this result tra

nsmitted



                                                                 reference range

: <=1.0.



                                                                 The reference r

aniceto was



                                                                 not used to int

erpret



                                                                 this result as



                                                                 normal/abnormal

.



[place] [id number] 20:25:00





             Test Item    Value        Reference Range Interpretation Comments

 

             Neutrophils # (test code = Neutrophils 3.3          1.5-8.1        

           



             #)                                                  



[place] [id number] 20:25:00





             Test Item    Value        Reference Range Interpretation Comments

 

             Lymphocytes # (test code = Lymphocytes 2.9          1.0-5.5        

           



             #)                                                  



[place] [id number] 20:25:00





             Test Item    Value        Reference Range Interpretation Comments

 

             Monocytes # (test code 0.6          See_Comment                [Aut

omated message] The



             = Monocytes #)                                        system which 

generated



                                                                 this result tra

nsmitted



                                                                 reference range

: <=0.8.



                                                                 The reference r

aniceto was



                                                                 not used to int

erpret



                                                                 this result as



                                                                 normal/abnormal

.



[place] [id number] 20:25:00





             Test Item    Value        Reference Range Interpretation Comments

 

             Eosinophils # (test code 0.1          See_Comment                [A

utomated message] The



             = Eosinophils #)                                        system whic

h generated



                                                                 this result tra

nsmitted



                                                                 reference range

: <=0.5.



                                                                 The reference r

aniceto was



                                                                 not used to int

erpret



                                                                 this result as



                                                                 normal/abnormal

.



[place] [id number] 20:25:00





             Test Item    Value        Reference Range Interpretation Comments

 

             WBC (test code = WBC) 6.9          3.7-10.4                  



[place] [id number] 20:25:00





             Test Item    Value        Reference Range Interpretation Comments

 

             RBC (test code = RBC) 4.54         4.20-5.40                 



[place] 31649-05-01 15:59:00





             Test Item    Value        Reference Range Interpretation Comments

 

             Coronavirus (COVID-19) Not Detected (21                        

   



             MARIBELL (test code = 10:59 AM)                              



             Coronavirus (COVID-19)                                        



             MARIBELL)                                                



[place] [id number] 15:59:00





             Test Item    Value        Reference Range Interpretation Comments

 

             Coronavirus (COVID-19) Not Detected (21                        

   



             MARIBELL (test code = 10:59 AM)                              



             Coronavirus (COVID-19)                                        



             MARIBELL)                                                



[place] 31649-05-01 15:59:00





             Test Item    Value        Reference Range Interpretation Comments

 

             Coronavirus (COVID-19) Not Detected (21                        

   



             MARIBELL (test code = 10:59 AM)                              



             Coronavirus (COVID-19)                                        



             MARIBELL)                                                



[facility]

## 2023-01-30 NOTE — ER
Nurse's Notes                                                                                     

 [facility]                                                                 

Name: Maria Teresa Mtz                                                                                  

Age: 22 yrs                                                                                       

Sex: Female                                                                                       

: 2000                                                                                   

MRN: [id number]                                                                                   

Arrival Date: 2023                                                                          

Time: 13:06                                                                                       

Account#: [id number]                                                                            

Bed 12                                                                                            

Private MD:                                                                                       

Diagnosis: Encounter for pregnancy test, result negative                                          

                                                                                                  

Presentation:                                                                                     

                                                                                             

13:17 Chief complaint: Patient states: I was on depo but I switched to IUD in my arm a couple jh5 

      months ago; 2 weeks ago I started bleeding; just spotting but I think I am pregnant         

      because I have been having lower belly cramps and back pain. My breast has been             

      leaking, like a discharge. I have taken pregnancy test at home but theyre negative with     

      maybe a really light second line, i dont know. Coronavirus screen: Vaccine status:          

      Patient reports receiving the 2nd dose of the covid vaccine. Ebola Screen: Patient          

      negative for fever greater than or equal to 101.5 degrees Fahrenheit, and additional        

      compatible Ebola Virus Disease symptoms Patient denies exposure to infectious person.       

      Patient denies travel to an Ebola-affected area in the 21 days before illness onset.        

      Initial Sepsis Screen: Does the patient meet any 2 criteria? No. Patient's initial          

      sepsis screen is negative. Does the patient have a suspected source of infection? No.       

      Patient's initial sepsis screen is negative. Risk Assessment: Do you want to hurt           

      yourself or someone else? Patient reports no desire to harm self or others. Onset of        

      symptoms was 2023.                                                                  

13:17 Method Of Arrival: Ambulatory                                                           [facility] 

13:17 Acuity: BRITTON 3                                                                           jh5 

                                                                                                  

Triage Assessment:                                                                                

13:20 General: Appears uncomfortable, slender, well groomed, well developed, Behavior is      jh5 

      calm, cooperative, appropriate for age. Pain: Complains of pain in abdomen.                 

      Musculoskeletal: Circulation, motion, and sensation intact.                                 

                                                                                                  

OB/GYN:                                                                                           

13:20 LMP N/A - Depo-provera                                                                  5 

                                                                                                  

Historical:                                                                                       

- Allergies:                                                                                      

16:30 No Known Allergies;                                                                     ap3 

- PMHx:                                                                                           

13:20 Seizures;                                                                               jh5 

- PSHx:                                                                                           

13:20 Tonsillectomy;                                                                          jh5 

                                                                                                  

- Immunization history:: Adult Immunizations up to date.                                          

- Social history:: Smoking status: Patient denies any tobacco usage or history of.                

                                                                                                  

                                                                                                  

Screening:                                                                                        

15:09 [facility] ED Fall Risk Assessment (Adult) History of falling in the last 3 months,       ap3 

      including since admission No falls in past 3 months (0 pts). Abuse screen: Denies           

      threats or abuse. Nutritional screening: No deficits noted. Tuberculosis screening: No      

      symptoms or risk factors identified.                                                        

                                                                                                  

Assessment:                                                                                       

15:08 General: Appears in no apparent distress. Behavior is calm, cooperative. Pain: Denies   ap3 

      pain. Neuro: Level of Consciousness is awake, alert, obeys commands, Oriented to            

      person, place, time, situation, Moves all extremities. Gait is steady, Speech is            

      normal. Cardiovascular: Patient's skin is warm and dry. Respiratory: Airway is patent       

      Respiratory effort is even, unlabored, Respiratory pattern is regular, symmetrical. :     

      Patient is sexually active.                                                                 

                                                                                                  

Vital Signs:                                                                                      

13:17  / 90; Pulse 93; Resp 18; Temp 97.8; Pulse Ox 100% ; Weight 72.57 kg; Height 5    [facility] 

      ft. 4 in. (162.56 cm); Pain 5/10;                                                           

13:17 Body Mass Index 27.46 (72.57 kg, 162.56 cm)                                             [facility] 

                                                                                                  

ED Course:                                                                                        

13:06 Patient arrived in ED.                                                                  as  

13:20 Triage completed.                                                                       [facility] 

13:20 Arm band placed on right wrist.                                                         [facility] 

13:21 [facility] Black [facility]                                                          [facility] 

13:21 Steve Gary MD is Attending Physician.                                                [facility] 

15:00 Lashon Suresh, RN is Primary Nurse.                                                  ap3 

15:08 Inserted saline lock: 20 gauge in right antecubital area, using aseptic technique.      ap3 

      Blood collected.                                                                            

15:09 Patient has correct armband on for positive identification. Bed in low position. Call   ap3 

      light in reach. Side rails up X 1. Adult w/ patient. Pulse ox on. NIBP on. Door closed.     

      Noise minimized.                                                                            

16:29 No provider procedures requiring assistance completed. IV discontinued, intact,         ap3 

      bleeding controlled, No redness/swelling at site. Pressure dressing applied.                

                                                                                                  

Administered Medications:                                                                         

No medications were administered                                                                  

                                                                                                  

                                                                                                  

Medication:                                                                                       

15:09 VIS not applicable for this client.                                                     ap3 

                                                                                                  

Outcome:                                                                                          

16:19 Discharge ordered by MD.                                                                [facility] 

16:29 Discharged to home ambulatory.                                                          ap3 

16:29 Condition: good                                                                             

16:29 Discharge instructions given to patient, Instructed on discharge instructions, follow       

      up and referral plans. Demonstrated understanding of instructions, follow-up care.          

16:43 Patient left the ED.                                                                    ap3 

                                                                                                  

Signatures:                                                                                       

Kymberly Washington Amanda RN                    RN   ap3                                                  

Violeta Figueroa RN                       RN   jh5                                                  

Bhavani Black FNP FNP  jh7                                                  

                                                                                                  

**************************************************************************************************

## 2023-01-30 NOTE — EDPHYS
Physician Documentation                                                                           

 [facility]                                                                 

Name: Maria Teresa Mtz                                                                                  

Age: 22 yrs                                                                                       

Sex: Female                                                                                       

: 2000                                                                                   

MRN: [id number]                                                                                   

Arrival Date: 2023                                                                          

Time: 13:06                                                                                       

Account#: [id number]                                                                            

Bed 12                                                                                            

Private MD:                                                                                       

ED Physician Steve Gary                                                                         

HPI:                                                                                              

                                                                                             

13:20 This 22 yrs old Black Female presents to ER via Ambulatory with complaints of Pelvic    jh7 

      Pain, Back Pain, Nausea.                                                                    

13:20 Onset: The symptoms/episode began/occurred 3 day(s) ago. The patient states that she    jh7 

      was recently switched from the Depo shot to Nexplanon. Reports abdominal cramping, n,       

      and mild back pain as of the past 3 days. States that she was already checked for STIs      

      which were negative. She is concerned that she is pregnant..                                

                                                                                                  

OB/GYN:                                                                                           

13:20 LMP N/A - Depo-provera                                                                  jh5 

                                                                                                  

Historical:                                                                                       

- Allergies:                                                                                      

16:30 No Known Allergies;                                                                     ap3 

- PMHx:                                                                                           

13:20 Seizures;                                                                               jh5 

- PSHx:                                                                                           

13:20 Tonsillectomy;                                                                          jh5 

                                                                                                  

- Immunization history:: Adult Immunizations up to date.                                          

- Social history:: Smoking status: Patient denies any tobacco usage or history of.                

                                                                                                  

                                                                                                  

ROS:                                                                                              

13:20 Constitutional: Negative for fever, chills, and weight loss, Eyes: Negative for injury, jh7 

      pain, redness, and discharge, Cardiovascular: Negative for chest pain, palpitations,        

      and edema, Respiratory: Negative for shortness of breath, cough, wheezing, and              

      pleuritic chest pain, Back: Negative for injury and pain, MS/Extremity: Negative for        

      injury and deformity, Skin: Negative for injury, rash, and discoloration, Neuro:            

      Negative for headache, weakness, numbness, tingling, and seizure.                           

13:20 Abdomen/GI: Positive for nausea, abdominal cramps, Negative for vomiting, diarrhea,         

      constipation.                                                                               

13:20 : Positive for vaginal bleeding.                                                          

13:20 All other systems are negative.                                                             

                                                                                                  

Exam:                                                                                             

13:20 Constitutional:  This is a well developed, well nourished patient who is awake, alert,  jh7 

      and in no acute distress. Head/Face:  Normocephalic, atraumatic. Cardiovascular:            

      Regular rate and rhythm with a normal S1 and S2.  No gallops, murmurs, or rubs.  Normal     

      PMI, no JVD.  No pulse deficits. Respiratory:  Lungs have equal breath sounds               

      bilaterally, clear to auscultation and percussion.  No rales, rhonchi or wheezes noted.     

       No increased work of breathing, no retractions or nasal flaring. Abdomen/GI:  Soft,        

      non-tender, with normal bowel sounds.  No distension or tympany.  No guarding or            

      rebound.  No evidence of tenderness throughout. Back:  No spinal tenderness.  No            

      costovertebral tenderness.  Full range of motion. Skin:  Warm, dry with normal turgor.      

      Normal color with no rashes, no lesions, and no evidence of cellulitis. MS/ Extremity:      

      Pulses equal, no cyanosis.  Neurovascular intact.  Full, normal range of motion. Neuro:     

       Awake and alert, GCS 15, oriented to person, place, time, and situation.  Motor            

      strength 5/5 in all extremities.  Sensory grossly intact. Normal gait.                      

                                                                                                  

Vital Signs:                                                                                      

13:17  / 90; Pulse 93; Resp 18; Temp 97.8; Pulse Ox 100% ; Weight 72.57 kg; Height 5    [facility] 

      ft. 4 in. (162.56 cm); Pain 5/10;                                                           

13:17 Body Mass Index 27.46 (72.57 kg, 162.56 cm)                                             [facility] 

                                                                                                  

MDM:                                                                                              

13:21 Patient medically screened.                                                             [facility] 

16:20 Differential diagnosis: UTI, Pregnancy, incomplete miscarriage. Data reviewed: vital    [facility] 

      signs, nurses notes, lab test result(s). Counseling: I had a detailed discussion with       

      the patient and/or guardian regarding: the historical points, exam findings, and any        

      diagnostic results supporting the discharge/admit diagnosis, to return to the emergency     

      department if symptoms worsen or persist or if there are any questions or concerns that     

      arise at home. ED course: The patient later stated that she really only wanted the          

      pregnancy blood test and declined urine sample. Provided her with her results and she       

      stated that she was ready to be discharged..                                                

                                                                                                  

                                                                                             

13:24 Order name: Basic Metabolic Panel; Complete Time: 16:17                                 [facility] 

                                                                                             

13:24 Order name: CBC with Diff; Complete Time: 15:33                                         [facility] 

                                                                                             

13:24 Order name: Quantitative Hcg; Complete Time: 16:17                                      [facility] 

                                                                                             

13:24 Order name: IV Saline Lock; Complete Time: 15:08                                        [facility] 

                                                                                             

13:24 Order name: Labs collected and sent; Complete Time: 15:08                               [facility] 

                                                                                             

13:24 Order name: NPO; Complete Time: 15:00                                                   [facility] 

                                                                                                  

Administered Medications:                                                                         

No medications were administered                                                                  

                                                                                                  

                                                                                                  

Disposition:                                                                                      

                                                                                             

07:00 Co-signature as Attending Physician, Steve Gary MD I reviewed the patient's care       rn  

      provided by the Advanced Practice Provider and agree with the diagnosis and treatment       

      plan.                                                                                       

                                                                                                  

Disposition Summary:                                                                              

23 16:19                                                                                    

Discharge Ordered                                                                                 

      Location: Home                                                                          [facility] 

      Problem: new                                                                            [facility] 

      Symptoms: are unchanged                                                                 [facility] 

      Condition: Stable                                                                       [facility] 

      Diagnosis                                                                                   

        - Encounter for pregnancy test, result negative                                       [facility] 

      Followup:                                                                               [facility] 

        - With: Private Physician                                                                  

        - When: 2 - 3 days                                                                         

        - Reason: Recheck today's complaints                                                       

      Discharge Instructions:                                                                     

        - Discharge Summary Sheet                                                             [facility] 

        - Contraception Choices                                                               [facility] 

        - Hormonal Contraception Information                                                  [facility] 

      Forms:                                                                                      

        - Medication Reconciliation Form                                                      [facility] 

        - Thank You Letter                                                                    [facility] 

Signatures:                                                                                       

Dispatcher [employer]                           Steve Jimenez MD MD rn Prokisch, Amanda RN                    RN   3                                                  

Violeta Figueroa RN                       RN   jh5                                                  

Bhavani Black, STEPHIEP                   FNP  [facility]                                                  

                                                                                                  

Corrections: (The following items were deleted from the chart)                                    

                                                                                             

16:19 13:24 Urine Dipstick-Ancillary ordered. [place] [id number] 

16:19 13:24 Urine Pregnancy Test ordered. [place] [id number] 

                                                                                                  

**************************************************************************************************